# Patient Record
Sex: FEMALE | Race: WHITE | NOT HISPANIC OR LATINO | Employment: FULL TIME | URBAN - METROPOLITAN AREA
[De-identification: names, ages, dates, MRNs, and addresses within clinical notes are randomized per-mention and may not be internally consistent; named-entity substitution may affect disease eponyms.]

---

## 2017-01-02 DIAGNOSIS — F32.1 MAJOR DEPRESSIVE DISORDER, SINGLE EPISODE, MODERATE (H): ICD-10-CM

## 2017-01-02 DIAGNOSIS — F41.9 ANXIETY: Primary | ICD-10-CM

## 2017-01-02 NOTE — TELEPHONE ENCOUNTER
lexapro     Last Written Prescription Date: 10/03/16  Last Fill Quantity: 90, # refills: 0  Last Office Visit with [facility] primary care provider:  10/24/16        Last PHQ-9 score on record=   PHQ-9 SCORE 7/14/2016   Total Score -   Total Score MyChart -   Total Score 0     Sent phq9 via mychart  Routing refill request to provider for review/approval because:  Due for OV for RN protocol.  Anaya Vallejo RN  [facility]  [phone]

## 2017-01-03 ENCOUNTER — MYC MEDICAL ADVICE (OUTPATIENT)
Dept: FAMILY MEDICINE | Facility: CLINIC | Age: 52
End: 2017-01-03

## 2017-01-03 DIAGNOSIS — E03.9 ACQUIRED HYPOTHYROIDISM: Primary | ICD-10-CM

## 2017-01-03 RX ORDER — ESCITALOPRAM OXALATE 20 MG/1
TABLET ORAL
Qty: 90 TABLET | Refills: 0 | Status: SHIPPED | OUTPATIENT
Start: 2017-01-03 | End: 2017-04-02

## 2017-01-03 NOTE — TELEPHONE ENCOUNTER
Please see My Chart message below and advise as current medication listed is to take 1 tablet daily.  Please clarify and correct dose.    Entered by Prerna Cunningham APRN CNP at 10/26/2016 12:41 PM      Read by Suni Mckeon at 1/3/2017 10:03 AM     TSH (thyroid test) was higher than 9 months ago. You would benefit from an increase in levothyroxine. Please take TWO tablets daily for 8 weeks and come for a thyroid lab test     Jena Flores RN  [facility] [employer]

## 2017-01-04 PROBLEM — E03.9 ACQUIRED HYPOTHYROIDISM: Status: ACTIVE | Noted: 2017-01-04

## 2017-01-04 RX ORDER — LEVOTHYROXINE SODIUM 50 UG/1
50 TABLET ORAL DAILY
Qty: 30 TABLET | Refills: 1 | Status: SHIPPED | OUTPATIENT
Start: 2017-01-04 | End: 2017-03-02

## 2017-01-04 NOTE — TELEPHONE ENCOUNTER
Non-detailed message left to return our call or check My chart for information.    Jena Flores RN   [facility] [employer] [facility]

## 2017-01-04 NOTE — TELEPHONE ENCOUNTER
I did a refill to [facility] [place] but it is for one 50 mcg tablet of levothyroxine for her to take (instead of having to take 2 tablets).

## 2017-01-06 NOTE — TELEPHONE ENCOUNTER
PHQ-9 SCORE 1/14/2016 7/14/2016 1/3/2017   Total Score - - -   Total Score MyChart - - 0   Total Score 0 0 -       Brynn Gonzales RN   [facility] - Triage

## 2017-01-08 DIAGNOSIS — F32.1 MAJOR DEPRESSIVE DISORDER, SINGLE EPISODE, MODERATE (H): Primary | ICD-10-CM

## 2017-01-09 NOTE — TELEPHONE ENCOUNTER
Nortriptyline     Last Written Prescription Date: 10/12/16  Last Fill Quantity: 180, # refills: 0  Last Office Visit with [facility], [facility] or  [facility] prescribing provider: 10/24/16        BP Readings from Last 3 Encounters:   10/24/16 116/72   07/14/16 111/78   01/14/16 110/74     Last PHQ-9 score on record=   PHQ-9 SCORE 1/3/2017   Total Score -   Total Score MyChart 0   Total Score -         Dahiana Aguirre CMA

## 2017-01-10 RX ORDER — NORTRIPTYLINE HYDROCHLORIDE 50 MG/1
CAPSULE ORAL
Qty: 180 CAPSULE | Refills: 0 | OUTPATIENT
Start: 2017-01-10

## 2017-01-12 RX ORDER — NORTRIPTYLINE HYDROCHLORIDE 50 MG/1
CAPSULE ORAL
Qty: 180 CAPSULE | Refills: 0 | Status: SHIPPED | OUTPATIENT
Start: 2017-01-12 | End: 2017-04-12

## 2017-01-12 NOTE — TELEPHONE ENCOUNTER
nortriptyline (PAMELOR) 50 MG capsule for depression  PHQ9 0  On 1/3/17  Prescription approved per [facility] Refill Protocol.  Radha Wilks RN- [facility] [employer]

## 2017-01-12 NOTE — TELEPHONE ENCOUNTER
Pharmacy called to see why this med was denied. Please refill, not a duplicate request. Thank you.  Angela Hale,

## 2017-01-27 ENCOUNTER — HOSPITAL ENCOUNTER (OUTPATIENT)
Facility: CLINIC | Age: 52
Discharge: HOME | End: 2017-01-27
Attending: COLON & RECTAL SURGERY | Admitting: COLON & RECTAL SURGERY
Payer: COMMERCIAL

## 2017-01-27 ENCOUNTER — SURGERY (OUTPATIENT)
Age: 52
End: 2017-01-27

## 2017-01-27 VITALS
OXYGEN SATURATION: 100 % | DIASTOLIC BLOOD PRESSURE: 85 MMHG | SYSTOLIC BLOOD PRESSURE: 153 MMHG | WEIGHT: 175 LBS | BODY MASS INDEX: 29.16 KG/M2 | RESPIRATION RATE: 16 BRPM | HEIGHT: 65 IN

## 2017-01-27 LAB — COLONOSCOPY: NORMAL

## 2017-01-27 PROCEDURE — 25000125 ZZHC RX 250: Performed by: COLON & RECTAL SURGERY

## 2017-01-27 PROCEDURE — 45378 DIAGNOSTIC COLONOSCOPY: CPT | Performed by: COLON & RECTAL SURGERY

## 2017-01-27 PROCEDURE — G0121 COLON CA SCRN NOT HI RSK IND: HCPCS | Performed by: COLON & RECTAL SURGERY

## 2017-01-27 PROCEDURE — 99153 MOD SED SAME PHYS/QHP EA: CPT | Performed by: COLON & RECTAL SURGERY

## 2017-01-27 PROCEDURE — 25000128 H RX IP 250 OP 636: Performed by: COLON & RECTAL SURGERY

## 2017-01-27 PROCEDURE — G0500 MOD SEDAT ENDO SERVICE >5YRS: HCPCS | Performed by: COLON & RECTAL SURGERY

## 2017-01-27 RX ORDER — LIDOCAINE 40 MG/G
CREAM TOPICAL
Status: DISCONTINUED | OUTPATIENT
Start: 2017-01-27 | End: 2017-01-27 | Stop reason: HOSPADM

## 2017-01-27 RX ORDER — ONDANSETRON 4 MG/1
4 TABLET, ORALLY DISINTEGRATING ORAL EVERY 6 HOURS PRN
Status: DISCONTINUED | OUTPATIENT
Start: 2017-01-27 | End: 2017-01-27 | Stop reason: HOSPADM

## 2017-01-27 RX ORDER — NALOXONE HYDROCHLORIDE 0.4 MG/ML
.1-.4 INJECTION, SOLUTION INTRAMUSCULAR; INTRAVENOUS; SUBCUTANEOUS
Status: DISCONTINUED | OUTPATIENT
Start: 2017-01-27 | End: 2017-01-27 | Stop reason: HOSPADM

## 2017-01-27 RX ORDER — ONDANSETRON 2 MG/ML
INJECTION INTRAMUSCULAR; INTRAVENOUS PRN
Status: DISCONTINUED | OUTPATIENT
Start: 2017-01-27 | End: 2017-01-27 | Stop reason: HOSPADM

## 2017-01-27 RX ORDER — ONDANSETRON 2 MG/ML
4 INJECTION INTRAMUSCULAR; INTRAVENOUS EVERY 6 HOURS PRN
Status: DISCONTINUED | OUTPATIENT
Start: 2017-01-27 | End: 2017-01-27 | Stop reason: HOSPADM

## 2017-01-27 RX ORDER — ONDANSETRON 2 MG/ML
4 INJECTION INTRAMUSCULAR; INTRAVENOUS
Status: DISCONTINUED | OUTPATIENT
Start: 2017-01-27 | End: 2017-01-27 | Stop reason: HOSPADM

## 2017-01-27 RX ORDER — FENTANYL CITRATE 50 UG/ML
INJECTION, SOLUTION INTRAMUSCULAR; INTRAVENOUS PRN
Status: DISCONTINUED | OUTPATIENT
Start: 2017-01-27 | End: 2017-01-27 | Stop reason: HOSPADM

## 2017-01-27 RX ORDER — FLUMAZENIL 0.1 MG/ML
0.2 INJECTION, SOLUTION INTRAVENOUS
Status: DISCONTINUED | OUTPATIENT
Start: 2017-01-27 | End: 2017-01-27 | Stop reason: HOSPADM

## 2017-01-27 RX ORDER — SODIUM CHLORIDE 9 MG/ML
INJECTION, SOLUTION INTRAVENOUS CONTINUOUS PRN
Status: DISCONTINUED | OUTPATIENT
Start: 2017-01-27 | End: 2017-01-27 | Stop reason: HOSPADM

## 2017-01-27 RX ADMIN — SODIUM CHLORIDE 1000 ML/HR: 9 INJECTION, SOLUTION INTRAVENOUS at 12:14

## 2017-01-27 RX ADMIN — SODIUM CHLORIDE 1000 ML: 9 INJECTION, SOLUTION INTRAVENOUS at 13:56

## 2017-01-27 RX ADMIN — MIDAZOLAM HYDROCHLORIDE 1 MG: 1 INJECTION, SOLUTION INTRAMUSCULAR; INTRAVENOUS at 13:19

## 2017-01-27 RX ADMIN — ONDANSETRON 4 MG: 2 INJECTION INTRAMUSCULAR; INTRAVENOUS at 13:55

## 2017-01-27 RX ADMIN — MIDAZOLAM HYDROCHLORIDE 2 MG: 1 INJECTION, SOLUTION INTRAMUSCULAR; INTRAVENOUS at 13:04

## 2017-01-27 RX ADMIN — FENTANYL CITRATE 100 MCG: 50 INJECTION, SOLUTION INTRAMUSCULAR; INTRAVENOUS at 13:04

## 2017-01-27 NOTE — H&P
Pre-Endoscopy History and Physical     Suni Mckeon MRN# [id number]   YOB: 1965 Age: 51 year old     Date of Procedure: 1/27/2017  Primary care provider: Prerna Cunningham  Type of Endoscopy: Colonoscopy  Reason for Procedure: Screening  Type of Anesthesia Anticipated: Moderate Sedation    HPI:    Suni is a 51 year old female who will be undergoing the above procedure.      A history and physical has been performed. The patient's medications and allergies have been reviewed. The risks and benefits of the procedure and the sedation options and risks were discussed with the patient.  All questions were answered and informed consent was obtained.      She denies a personal or family history of anesthesia complications or bleeding disorders.     Allergies   Allergen Reactions     Bupropion Itching     Tobramycin Visual Disturbance     used for pink eye and eyes became more irritated, burning and itchy          No current facility-administered medications on file prior to encounter.  Current Outpatient Prescriptions on File Prior to Encounter:  escitalopram (LEXAPRO) 20 MG tablet TAKE ONE TABLET BY MOUTH ONE TIME DAILY   levothyroxine (SYNTHROID, LEVOTHROID) 25 MCG tablet Take 1 tablet (25 mcg) by mouth daily   levothyroxine (SYNTHROID/LEVOTHROID) 50 MCG tablet Take 1 tablet (50 mcg) by mouth daily       Patient Active Problem List   Diagnosis     CARDIOVASCULAR SCREENING; LDL GOAL LESS THAN 160     Anxiety     Major depressive disorder, single episode, moderate (H)     Acquired hypothyroidism        Past Medical History   Diagnosis Date     Irritable bowel syndrome 1994     constipation     Pyelonephritis, unspecified 2006     Recurrent cold sores      Lysine     Moderate major depression (H)      Anxiety      Thyroid disease         Past Surgical History   Procedure Laterality Date     Surgical history of -   2005     Exc. benign lesion left lower eyelid       Social History   Substance Use Topics      "Smoking status: Never Smoker      Smokeless tobacco: Never Used     Alcohol Use: 0.0 - 1.2 oz/week     0-2 Standard drinks or equivalent per week      Comment: 2-3 drinks per mth       Family History   Problem Relation Age of Onset     DIABETES Paternal Grandmother      Psychotic Disorder Maternal Grandmother      depression     Lipids Father        REVIEW OF SYSTEMS:     5 point ROS negative except as noted above in HPI, including Gen., Resp., CV, GI &  system review.      PHYSICAL EXAM:   /53 mmHg  Resp 16  Ht 1.651 m (5' 5\")  Wt 79.379 kg (175 lb)  BMI 29.12 kg/m2  SpO2 98%  LMP 12/22/2016 Estimated body mass index is 29.12 kg/(m^2) as calculated from the following:    Height as of this encounter: 1.651 m (5' 5\").    Weight as of this encounter: 79.379 kg (175 lb).   GENERAL APPEARANCE: healthy and alert  MENTAL STATUS: alert  AIRWAY EXAM: Mallampatti Class II (visualization of the soft palate, fauces, and uvula)  RESP: lungs clear to auscultation - no rales, rhonchi or wheezes  CV: regular rates and rhythm, normal S1 S2, no S3 or S4 and no murmur, click or rub      IMPRESSION   ASA Class 1 - Healthy patient, no medical problems        PLAN:     Plan for colonoscopy. We discussed the risks, benefits and alternatives and the patient wished to proceed.    The above has been forwarded to the consulting provider.      Duc Grant MD  [facility]  Phone: [phone]  January 27, 2017    "

## 2017-01-27 NOTE — OR NURSING
Dr Grant in and talkis with patient, she is to have full liquids until Sunday and reprep for colonoscopy on Monday with DR Osuna. Pt prep poor.

## 2017-01-30 ENCOUNTER — SURGERY (OUTPATIENT)
Age: 52
End: 2017-01-30

## 2017-01-30 ENCOUNTER — HOSPITAL ENCOUNTER (OUTPATIENT)
Facility: CLINIC | Age: 52
Discharge: HOME | End: 2017-01-30
Attending: COLON & RECTAL SURGERY | Admitting: COLON & RECTAL SURGERY
Payer: COMMERCIAL

## 2017-01-30 VITALS
SYSTOLIC BLOOD PRESSURE: 112 MMHG | RESPIRATION RATE: 18 BRPM | OXYGEN SATURATION: 100 % | DIASTOLIC BLOOD PRESSURE: 77 MMHG

## 2017-01-30 LAB — COLONOSCOPY: NORMAL

## 2017-01-30 PROCEDURE — 25000125 ZZHC RX 250: Performed by: COLON & RECTAL SURGERY

## 2017-01-30 PROCEDURE — 45378 DIAGNOSTIC COLONOSCOPY: CPT | Performed by: COLON & RECTAL SURGERY

## 2017-01-30 PROCEDURE — G0500 MOD SEDAT ENDO SERVICE >5YRS: HCPCS | Performed by: COLON & RECTAL SURGERY

## 2017-01-30 PROCEDURE — 99153 MOD SED SAME PHYS/QHP EA: CPT | Performed by: COLON & RECTAL SURGERY

## 2017-01-30 PROCEDURE — G0121 COLON CA SCRN NOT HI RSK IND: HCPCS | Performed by: COLON & RECTAL SURGERY

## 2017-01-30 RX ORDER — ONDANSETRON 2 MG/ML
4 INJECTION INTRAMUSCULAR; INTRAVENOUS EVERY 6 HOURS PRN
Status: CANCELLED | OUTPATIENT
Start: 2017-01-30

## 2017-01-30 RX ORDER — FENTANYL CITRATE 50 UG/ML
INJECTION, SOLUTION INTRAMUSCULAR; INTRAVENOUS PRN
Status: DISCONTINUED | OUTPATIENT
Start: 2017-01-30 | End: 2017-01-30 | Stop reason: HOSPADM

## 2017-01-30 RX ORDER — FLUMAZENIL 0.1 MG/ML
0.2 INJECTION, SOLUTION INTRAVENOUS
Status: CANCELLED | OUTPATIENT
Start: 2017-01-30 | End: 2017-01-30

## 2017-01-30 RX ORDER — LIDOCAINE 40 MG/G
CREAM TOPICAL
Status: DISCONTINUED | OUTPATIENT
Start: 2017-01-30 | End: 2017-01-30 | Stop reason: HOSPADM

## 2017-01-30 RX ORDER — ONDANSETRON 4 MG/1
4 TABLET, ORALLY DISINTEGRATING ORAL EVERY 6 HOURS PRN
Status: CANCELLED | OUTPATIENT
Start: 2017-01-30

## 2017-01-30 RX ORDER — NALOXONE HYDROCHLORIDE 0.4 MG/ML
.1-.4 INJECTION, SOLUTION INTRAMUSCULAR; INTRAVENOUS; SUBCUTANEOUS
Status: CANCELLED | OUTPATIENT
Start: 2017-01-30 | End: 2017-01-31

## 2017-01-30 RX ORDER — ONDANSETRON 2 MG/ML
4 INJECTION INTRAMUSCULAR; INTRAVENOUS
Status: COMPLETED | OUTPATIENT
Start: 2017-01-30 | End: 2017-01-30

## 2017-01-30 RX ADMIN — ONDANSETRON HYDROCHLORIDE 4 MG: 2 INJECTION, SOLUTION INTRAMUSCULAR; INTRAVENOUS at 08:03

## 2017-01-30 RX ADMIN — FENTANYL CITRATE 100 MCG: 50 INJECTION, SOLUTION INTRAMUSCULAR; INTRAVENOUS at 08:27

## 2017-01-30 RX ADMIN — MIDAZOLAM HYDROCHLORIDE 2 MG: 1 INJECTION, SOLUTION INTRAMUSCULAR; INTRAVENOUS at 08:27

## 2017-01-30 NOTE — H&P
Pre-Endoscopy History and Physical     Suni Mckeon MRN# [id number]   YOB: 1965 Age: 51 year old     Date of Procedure: 1/30/2017  Primary care provider: Prerna Cunningham  Type of Endoscopy: Colonoscopy  Reason for Procedure: screening  Type of Anesthesia Anticipated: Moderate Sedation    HPI:    Suni is a 51 year old female who will be undergoing the above procedure.      A history and physical has been performed. The patient's medications and allergies have been reviewed. The risks and benefits of the procedure and the sedation options and risks were discussed with the patient.  All questions were answered and informed consent was obtained.      She denies a personal or family history of anesthesia complications or bleeding disorders.     Allergies   Allergen Reactions     Bupropion Itching     Tobramycin Visual Disturbance     used for pink eye and eyes became more irritated, burning and itchy          No current facility-administered medications on file prior to encounter.  Current Outpatient Prescriptions on File Prior to Encounter:  nortriptyline (PAMELOR) 50 MG capsule TAKE TWO CAPSULES BY MOUTH DAILY AT BEDTIME   levothyroxine (SYNTHROID/LEVOTHROID) 50 MCG tablet Take 1 tablet (50 mcg) by mouth daily   escitalopram (LEXAPRO) 20 MG tablet TAKE ONE TABLET BY MOUTH ONE TIME DAILY   levothyroxine (SYNTHROID, LEVOTHROID) 25 MCG tablet Take 1 tablet (25 mcg) by mouth daily       Patient Active Problem List   Diagnosis     CARDIOVASCULAR SCREENING; LDL GOAL LESS THAN 160     Anxiety     Major depressive disorder, single episode, moderate (H)     Acquired hypothyroidism        Past Medical History   Diagnosis Date     Irritable bowel syndrome 1994     constipation     Pyelonephritis, unspecified 2006     Recurrent cold sores      Lysine     Moderate major depression (H)      Anxiety      Thyroid disease         Past Surgical History   Procedure Laterality Date     Surgical history of -   2005     Exc.  "benign lesion left lower eyelid     Colonoscopy N/A 1/27/2017     Procedure: COLONOSCOPY;  Surgeon: Duc Grant MD;  Location:  GI       Social History   Substance Use Topics     Smoking status: Never Smoker      Smokeless tobacco: Never Used     Alcohol Use: 0.0 - 1.2 oz/week     0-2 Standard drinks or equivalent per week      Comment: 2-3 drinks per mth       Family History   Problem Relation Age of Onset     DIABETES Paternal Grandmother      Psychotic Disorder Maternal Grandmother      depression     Lipids Father        REVIEW OF SYSTEMS:     5 point ROS negative except as noted above in HPI, including Gen., Resp., CV, GI &  system review.      PHYSICAL EXAM:   /75 mmHg  Resp 14  SpO2 98%  LMP 12/22/2016 Estimated body mass index is 29.12 kg/(m^2) as calculated from the following:    Height as of 1/27/17: 1.651 m (5' 5\").    Weight as of 1/27/17: 79.379 kg (175 lb).   GENERAL APPEARANCE: healthy and alert  MENTAL STATUS: alert  AIRWAY EXAM: Mallampatti Class I (visualization of the soft palate, fauces, uvula, anterior and posterior pillars)  RESP: lungs clear to auscultation - no rales, rhonchi or wheezes  CV: regular rates and rhythm      IMPRESSION   ASA Class 2 - Mild systemic disease        PLAN:     Plan for colonoscopy. We discussed the risks, benefits and alternatives and the patient wished to proceed.    The above has been forwarded to the consulting provider.      Dahiana Osuna MD  [facility]  Phone: [phone]  Fax: [phone]  January 30, 2017    "

## 2017-03-03 DIAGNOSIS — E03.9 ACQUIRED HYPOTHYROIDISM: ICD-10-CM

## 2017-03-03 PROCEDURE — 36415 COLL VENOUS BLD VENIPUNCTURE: CPT | Performed by: NURSE PRACTITIONER

## 2017-03-03 PROCEDURE — 84443 ASSAY THYROID STIM HORMONE: CPT | Performed by: NURSE PRACTITIONER

## 2017-03-04 LAB — TSH SERPL DL<=0.005 MIU/L-ACNC: 2.63 MU/L (ref 0.4–4)

## 2017-04-01 DIAGNOSIS — E03.9 ACQUIRED HYPOTHYROIDISM: ICD-10-CM

## 2017-04-02 DIAGNOSIS — F41.9 ANXIETY: ICD-10-CM

## 2017-04-02 DIAGNOSIS — F32.1 MAJOR DEPRESSIVE DISORDER, SINGLE EPISODE, MODERATE (H): ICD-10-CM

## 2017-04-03 RX ORDER — LEVOTHYROXINE SODIUM 50 UG/1
TABLET ORAL
Qty: 90 TABLET | Refills: 1 | Status: SHIPPED | OUTPATIENT
Start: 2017-04-03 | End: 2017-09-24

## 2017-04-03 NOTE — TELEPHONE ENCOUNTER
levothyroxine     Last Written Prescription Date: 3/3/17  Last Quantity: 30, # refills: 0  Last Office Visit with [facility], [facility] or [facility] prescribing provider: 10/24/16        TSH   Date Value Ref Range Status   03/03/2017 2.63 0.40 - 4.00 mU/L Final     Prescription approved per [facility] Refill Protocol.  Brynn Gonzales RN   [facility] - Triage

## 2017-04-03 NOTE — TELEPHONE ENCOUNTER
Lexapro     Last Written Prescription Date:   Last Fill Quantity: 90 # refills: 0  Last Office Visit with [facility], [facility] or [facility] prescribing provider:  10/24/2016       Last PHQ-9 score on record=   PHQ-9 SCORE 1/3/2017   Total Score -   Total Score MyChart 0   Total Score -       Lab Results   Component Value Date    AST 21 07/09/2014     Lab Results   Component Value Date    ALT 24 07/09/2014

## 2017-04-04 NOTE — TELEPHONE ENCOUNTER
Routing refill request to provider for review/approval because:  Labs not current:  Ast/alt    Anaya VallejoRN  [facility]  [phone]

## 2017-04-05 RX ORDER — ESCITALOPRAM OXALATE 20 MG/1
TABLET ORAL
Qty: 90 TABLET | Refills: 0 | Status: SHIPPED | OUTPATIENT
Start: 2017-04-05 | End: 2017-07-01

## 2017-04-12 DIAGNOSIS — F32.1 MAJOR DEPRESSIVE DISORDER, SINGLE EPISODE, MODERATE (H): ICD-10-CM

## 2017-04-13 NOTE — TELEPHONE ENCOUNTER
Nortriptyline  Last Written Prescription Date: 1/12/17  Last Fill Quantity: 180, # refills: 0  Last Office Visit with [facility], [facility] or [facility] prescribing provider: 10/24/16        BP Readings from Last 3 Encounters:   01/30/17 112/77   01/27/17 153/85   10/24/16 116/72     Last PHQ-9 score on record=   PHQ-9 SCORE 1/3/2017   Total Score -   Total Score MyChart 0   Total Score -         ARELIS Garcia LPN

## 2017-04-14 RX ORDER — NORTRIPTYLINE HYDROCHLORIDE 50 MG/1
CAPSULE ORAL
Qty: 180 CAPSULE | Refills: 0 | Status: SHIPPED | OUTPATIENT
Start: 2017-04-14 | End: 2017-07-15

## 2017-04-14 NOTE — TELEPHONE ENCOUNTER
appt and PHQ-9 up to date and at goal  Refill request approved per [facility] protocol    Sue Euceda RN

## 2017-07-01 DIAGNOSIS — F32.1 MAJOR DEPRESSIVE DISORDER, SINGLE EPISODE, MODERATE (H): ICD-10-CM

## 2017-07-01 DIAGNOSIS — F41.9 ANXIETY: ICD-10-CM

## 2017-07-03 NOTE — TELEPHONE ENCOUNTER
Mini cath preformed, urine sent to lab   Sent phq9 via [employer] .  Anaya Vallejo,RN  [facility]  [phone]

## 2017-07-03 NOTE — TELEPHONE ENCOUNTER
escitalopram (LEXAPRO) 20 MG tablet     Last Written Prescription Date: 4/5/2017  Last Fill Quantity: 90, # refills: 0  Last Office Visit with [facility] primary care provider:  10/24/2016        Last PHQ-9 score on record=   PHQ-9 SCORE 1/3/2017   Total Score -   Total Score MyChart 0   Total Score -

## 2017-07-06 RX ORDER — ESCITALOPRAM OXALATE 20 MG/1
TABLET ORAL
Qty: 90 TABLET | Refills: 1 | Status: SHIPPED | OUTPATIENT
Start: 2017-07-06 | End: 2017-12-18

## 2017-07-06 NOTE — TELEPHONE ENCOUNTER
Called home and cell numbers- left message to call back asap or check [employer] message.  Routing to provider    Anaya Vallejo RN  [facility]  [phone]

## 2017-07-15 DIAGNOSIS — F32.1 MAJOR DEPRESSIVE DISORDER, SINGLE EPISODE, MODERATE (H): ICD-10-CM

## 2017-07-17 RX ORDER — NORTRIPTYLINE HYDROCHLORIDE 50 MG/1
CAPSULE ORAL
Qty: 180 CAPSULE | Refills: 0 | Status: SHIPPED | OUTPATIENT
Start: 2017-07-17 | End: 2017-10-10

## 2017-07-17 NOTE — TELEPHONE ENCOUNTER
Routing refill request to provider for review/approval because:  Patient needs to be seen because:  Prerna Cunningham Patient- provider retired    Anaya VallejoRN  [facility]  [phone]

## 2017-07-17 NOTE — TELEPHONE ENCOUNTER
nortriptyline (PAMELOR) 50 MG capsule     Last Written Prescription Date: 04/14/2017  Last Fill Quantity: 180 capsule, # refills: 0  Last Office Visit with [facility], [facility] or [facility] prescribing provider: 10/24/2016        BP Readings from Last 3 Encounters:   01/30/17 112/77   01/27/17 153/85   10/24/16 116/72     Last PHQ-9 score on record=   PHQ-9 SCORE 7/6/2017   Total Score MyChart 0   Total Score 0           Jada Alexander MA

## 2017-09-09 ENCOUNTER — HEALTH MAINTENANCE LETTER (OUTPATIENT)
Age: 52
End: 2017-09-09

## 2017-09-24 DIAGNOSIS — E03.9 ACQUIRED HYPOTHYROIDISM: ICD-10-CM

## 2017-09-25 RX ORDER — LEVOTHYROXINE SODIUM 50 UG/1
TABLET ORAL
Qty: 30 TABLET | Refills: 0 | Status: SHIPPED | OUTPATIENT
Start: 2017-09-25 | End: 2017-10-10

## 2017-09-25 NOTE — TELEPHONE ENCOUNTER
levothyroxine     Last Written Prescription Date: 4/3/17  Last Quantity: 90, # refills: 1  Last Office Visit with [facility], [facility] or [facility] prescribing provider: 10/24/16        TSH   Date Value Ref Range Status   03/03/2017 2.63 0.40 - 4.00 mU/L Final     Patient needs to establish care with new provider. 30 day supply ordered. Routing to team to inform and assist in scheduling.     Brynn Gonzales RN   [facility] - Triage

## 2017-09-27 NOTE — TELEPHONE ENCOUNTER
Patient has not looked at her mychart   left voicemail message for patient to contact Main Clinic Number to schedule.  NTBS: establish care with new provider and medication check  Mary Lou SPARROW

## 2017-10-10 ENCOUNTER — OFFICE VISIT (OUTPATIENT)
Dept: FAMILY MEDICINE | Facility: CLINIC | Age: 52
End: 2017-10-10
Payer: COMMERCIAL

## 2017-10-10 VITALS
WEIGHT: 185.8 LBS | TEMPERATURE: 97.5 F | RESPIRATION RATE: 16 BRPM | BODY MASS INDEX: 30.96 KG/M2 | OXYGEN SATURATION: 96 % | HEART RATE: 82 BPM | HEIGHT: 65 IN | DIASTOLIC BLOOD PRESSURE: 80 MMHG | SYSTOLIC BLOOD PRESSURE: 124 MMHG

## 2017-10-10 DIAGNOSIS — F32.1 MAJOR DEPRESSIVE DISORDER, SINGLE EPISODE, MODERATE (H): Primary | ICD-10-CM

## 2017-10-10 DIAGNOSIS — F41.9 ANXIETY: ICD-10-CM

## 2017-10-10 DIAGNOSIS — E03.9 ACQUIRED HYPOTHYROIDISM: ICD-10-CM

## 2017-10-10 PROCEDURE — 99213 OFFICE O/P EST LOW 20 MIN: CPT | Performed by: PHYSICIAN ASSISTANT

## 2017-10-10 RX ORDER — NORTRIPTYLINE HYDROCHLORIDE 50 MG/1
CAPSULE ORAL
Qty: 180 CAPSULE | Refills: 1 | Status: SHIPPED | OUTPATIENT
Start: 2017-10-10 | End: 2018-04-06

## 2017-10-10 RX ORDER — LEVOTHYROXINE SODIUM 50 UG/1
TABLET ORAL
Qty: 30 TABLET | Refills: 1 | Status: SHIPPED | OUTPATIENT
Start: 2017-10-10 | End: 2017-12-16

## 2017-10-10 ASSESSMENT — ANXIETY QUESTIONNAIRES
1. FEELING NERVOUS, ANXIOUS, OR ON EDGE: NOT AT ALL
IF YOU CHECKED OFF ANY PROBLEMS ON THIS QUESTIONNAIRE, HOW DIFFICULT HAVE THESE PROBLEMS MADE IT FOR YOU TO DO YOUR WORK, TAKE CARE OF THINGS AT HOME, OR GET ALONG WITH OTHER PEOPLE: NOT DIFFICULT AT ALL
3. WORRYING TOO MUCH ABOUT DIFFERENT THINGS: NOT AT ALL
7. FEELING AFRAID AS IF SOMETHING AWFUL MIGHT HAPPEN: NOT AT ALL
5. BEING SO RESTLESS THAT IT IS HARD TO SIT STILL: NOT AT ALL
GAD7 TOTAL SCORE: 0
6. BECOMING EASILY ANNOYED OR IRRITABLE: NOT AT ALL
2. NOT BEING ABLE TO STOP OR CONTROL WORRYING: NOT AT ALL

## 2017-10-10 ASSESSMENT — PATIENT HEALTH QUESTIONNAIRE - PHQ9
5. POOR APPETITE OR OVEREATING: NOT AT ALL
SUM OF ALL RESPONSES TO PHQ QUESTIONS 1-9: 0

## 2017-10-10 NOTE — MR AVS SNAPSHOT
"              After Visit Summary   10/10/2017    Suni Mckeon    MRN: [id number]           Patient Information     Date Of Birth          1965        Visit Information        Provider Department      10/10/2017 3:40 PM Edis Yoon PA-C [facility] [place]        Today's Diagnoses     Major depressive disorder, single episode, moderate (H)    -  1    Anxiety        Acquired hypothyroidism           Follow-ups after your visit        Who to contact     If you have questions or need follow up information about today's clinic visit or your schedule please contact [facility] directly at [phone].  Normal or non-critical lab and imaging results will be communicated to you by invendo medicalhart, letter or phone within 4 business days after the clinic has received the results. If you do not hear from us within 7 days, please contact the clinic through invendo medicalhart or phone. If you have a critical or abnormal lab result, we will notify you by phone as soon as possible.  Submit refill requests through [facility] or call your pharmacy and they will forward the refill request to us. Please allow 3 business days for your refill to be completed.          Additional Information About Your Visit        MyChart Information     [facility] gives you secure access to your electronic health record. If you see a primary care provider, you can also send messages to your care team and make appointments. If you have questions, please call your primary care clinic.  If you do not have a primary care provider, please call [phone] and they will assist you.        Care EveryWhere ID     This is your Care EveryWhere ID. This could be used by other organizations to access your [facility] medical records  [id number]        Your Vitals Were     Pulse Temperature Respirations Height Last Period Pulse Oximetry    82 97.5  F (36.4  C) (Tympanic) 16 5' 5\" (1.651 m) 12/13/2016 (LMP Unknown) 96%    BMI (Body Mass " Index)                   30.92 kg/m2            Blood Pressure from Last 3 Encounters:   10/10/17 124/80   01/30/17 112/77   01/27/17 153/85    Weight from Last 3 Encounters:   10/10/17 185 lb 12.8 oz (84.3 kg)   01/27/17 175 lb (79.4 kg)   10/24/16 176 lb (79.8 kg)              We Performed the Following     DEPRESSION ACTION PLAN (DAP)          Today's Medication Changes          These changes are accurate as of: 10/10/17 11:59 PM.  If you have any questions, ask your nurse or doctor.               These medicines have changed or have updated prescriptions.        Dose/Directions    levothyroxine 50 MCG tablet   Commonly known as:  SYNTHROID/LEVOTHROID   This may have changed:  See the new instructions.   Used for:  Acquired hypothyroidism   Changed by:  Edis Yoon PA-C        TAKE 1 TABLET (50 MCG) BY MOUTH DAILY   Quantity:  30 tablet   Refills:  1       nortriptyline 50 MG capsule   Commonly known as:  PAMELOR   This may have changed:  See the new instructions.   Used for:  Major depressive disorder, single episode, moderate (H)   Changed by:  Edis Yoon PA-C        TAKE TWO CAPSULES BY MOUTH DAILY AT BEDTIME   Quantity:  180 capsule   Refills:  1            Where to get your medicines      These medications were sent to [place] [address] [place] [facility] [place] - [address]  [address] [place] [address]     Phone:  [phone]     levothyroxine 50 MCG tablet    nortriptyline 50 MG capsule                Primary Care Provider Office Phone # Fax #    Edis Yoon PA-C [phone]       [address]  [place] [address]        Equal Access to Services     [facility] AH: Hadii beau ku hadasho Sogustabo, waaxda luqadaha, qaybta kaalmada adeegyada, karla san. So [facility] [phone].    ATENCIÓN: Si habla español, tiene a may disposición servicios gratuitos de asistencia lingüística. Llame al  [phone].    We comply with applicable federal civil rights laws and [place] laws. We do not discriminate on the basis of race, color, national origin, age, disability, sex, sexual orientation, or gender identity.            Thank you!     Thank you for choosing [facility] [place]  for your care. Our goal is always to provide you with excellent care. Hearing back from our patients is one way we can continue to improve our services. Please take a few minutes to complete the written survey that you may receive in the mail after your visit with us. Thank you!             Your Updated Medication List - Protect others around you: Learn how to safely use, store and throw away your medicines at www.disposemymeds.org.          This list is accurate as of: 10/10/17 11:59 PM.  Always use your most recent med list.                   Brand Name Dispense Instructions for use Diagnosis    escitalopram 20 MG tablet    LEXAPRO    90 tablet    TAKE ONE TABLET BY MOUTH ONE TIME DAILY    Anxiety, Major depressive disorder, single episode, moderate (H)       levothyroxine 50 MCG tablet    SYNTHROID/LEVOTHROID    30 tablet    TAKE 1 TABLET (50 MCG) BY MOUTH DAILY    Acquired hypothyroidism       nortriptyline 50 MG capsule    PAMELOR    180 capsule    TAKE TWO CAPSULES BY MOUTH DAILY AT BEDTIME    Major depressive disorder, single episode, moderate (H)

## 2017-10-10 NOTE — LETTER
My Depression Action Plan  Name: Suni Mckeon   Date of Birth 1965  Date: 10/10/2017    My doctor: Edis Yoon   My clinic: [address] [address]  [phone]          GREEN    ZONE   Good Control    What it looks like:     Things are going generally well. You have normal up s and down s. You may even feel depressed from time to time, but bad moods usually last less than a day.   What you need to do:  1. Continue to care for yourself (see self care plan)  2. Check your depression survival kit and update it as needed  3. Follow your physician s recommendations including any medication.  4. Do not stop taking medication unless you consult with your physician first.           YELLOW         ZONE Getting Worse    What it looks like:     Depression is starting to interfere with your life.     It may be hard to get out of bed; you may be starting to isolate yourself from others.    Symptoms of depression are starting to last most all day and this has happened for several days.     You may have suicidal thoughts but they are not constant.   What you need to do:     1. Call your care team, your response to treatment will improve if you keep your care team informed of your progress. Yellow periods are signs an adjustment may need to be made.     2. Continue your self-care, even if you have to fake it!    3. Talk to someone in your support network    4. Open up your depression survival kit           RED    ZONE Medical Alert - Get Help    What it looks like:     Depression is seriously interfering with your life.     You may experience these or other symptoms: You can t get out of bed most days, can t work or engage in other necessary activities, you have trouble taking care of basic hygiene, or basic responsibilities, thoughts of suicide or death that will not go away, self-injurious behavior.     What you need to do:  1. Call  your care team and request a same-day appointment. If they are not available (weekends or after hours) call your local crisis line, emergency room or 911.      Electronically signed by: Edis Yoon, October 10, 2017    Depression Self Care Plan / Survival Kit    Self-Care for Depression  Here s the deal. Your body and mind are really not as separate as most people think.  What you do and think affects how you feel and how you feel influences what you do and think. This means if you do things that people who feel good do, it will help you feel better.  Sometimes this is all it takes.  There is also a place for medication and therapy depending on how severe your depression is, so be sure to consult with your medical provider and/ or Behavioral Health Consultant if your symptoms are worsening or not improving.     In order to better manage my stress, I will:    Exercise  Get some form of exercise, every day. This will help reduce pain and release endorphins, the  feel good  chemicals in your brain. This is almost as good as taking antidepressants!  This is not the same as joining a gym and then never going! (they count on that by the way ) It can be as simple as just going for a walk or doing some gardening, anything that will get you moving.      Hygiene   Maintain good hygiene (Get out of bed in the morning, Make your bed, Brush your teeth, Take a shower, and Get dressed like you were going to work, even if you are unemployed).  If your clothes don't fit try to get ones that do.    Diet  I will strive to eat foods that are good for me, drink plenty of water, and avoid excessive sugar, caffeine, alcohol, and other mood-altering substances.  Some foods that are helpful in depression are: complex carbohydrates, B vitamins, flaxseed, fish or fish oil, fresh fruits and vegetables.    Psychotherapy  I agree to participate in Individual Therapy (if recommended).    Medication  If prescribed medications, I agree to  take them.  Missing doses can result in serious side effects.  I understand that drinking alcohol, or other illicit drug use, may cause potential side effects.  I will not stop my medication abruptly without first discussing it with my provider.    Staying Connected With Others  I will stay in touch with my friends, family members, and my primary care provider/team.    Use your imagination  Be creative.  We all have a creative side; it doesn t matter if it s oil painting, sand castles, or mud pies! This will also kick up the endorphins.    Witness Beauty  (AKA stop and smell the roses) Take a look outside, even in mid-winter. Notice colors, textures. Watch the squirrels and birds.     Service to others  Be of service to others.  There is always someone else in need.  By helping others we can  get out of ourselves  and remember the really important things.  This also provides opportunities for practicing all the other parts of the program.    Humor  Laugh and be silly!  Adjust your TV habits for less news and crime-drama and more comedy.    Control your stress  Try breathing deep, massage therapy, biofeedback, and meditation. Find time to relax each day.     My support system    Clinic Contact:  Phone number:    Contact 1:  Phone number:    Contact 2:  Phone number:    [facility]/:  Phone number:    Therapist:  Phone number:    Local crisis center:    Phone number:    Other community support:  Phone number:

## 2017-10-10 NOTE — PROGRESS NOTES
SUBJECTIVE:   Suni Mckeon is a 52 year old female who presents to clinic today for the following health issues:    Establish Care      Depression:  Taking Nortriptyline and  Lexapro, this combination is working well for her depression.  Has a lot of stressors with her family but feels that right now her mood is well controlled.  Sees therapist.  Due for refills today.  See PHQ.    Hypothyroid:  Due for CPE and Thryoid check but too early per insurance today, needs refills, no symptoms.      Problem list and histories reviewed & adjusted, as indicated.  Additional history: as documented    Patient Active Problem List   Diagnosis     CARDIOVASCULAR SCREENING; LDL GOAL LESS THAN 160     Anxiety     Major depressive disorder, single episode, moderate (H)     Acquired hypothyroidism     Past Surgical History:   Procedure Laterality Date     COLONOSCOPY N/A 1/27/2017    NL, repeat 10 years     COLONOSCOPY N/A 1/30/2017    Procedure: COLONOSCOPY;  Surgeon: Dahiana Osuna MD;  Location:  GI     SURGICAL HISTORY OF -   2005    Exc. benign lesion left lower eyelid       Social History   Substance Use Topics     Smoking status: Never Smoker     Smokeless tobacco: Never Used     Alcohol use 0.0 - 1.2 oz/week     0 - 2 Standard drinks or equivalent per week      Comment: 2-3 drinks per mth     Family History   Problem Relation Age of Onset     DIABETES Paternal Grandmother      Psychotic Disorder Maternal Grandmother      depression     Lipids Father          Current Outpatient Prescriptions   Medication Sig Dispense Refill     levothyroxine (SYNTHROID/LEVOTHROID) 50 MCG tablet TAKE 1 TABLET (50 MCG) BY MOUTH DAILY 30 tablet 1     nortriptyline (PAMELOR) 50 MG capsule TAKE TWO CAPSULES BY MOUTH DAILY AT BEDTIME 180 capsule 1     escitalopram (LEXAPRO) 20 MG tablet TAKE ONE TABLET BY MOUTH ONE TIME DAILY 90 tablet 1     Allergies   Allergen Reactions     Bupropion Itching     Tobramycin Visual Disturbance     used  "for pink eye and eyes became more irritated, burning and itchy         Reviewed and updated as needed this visit by clinical staff     Reviewed and updated as needed this visit by Provider         ROS:  Constitutional, HEENT, cardiovascular, pulmonary, gi and gu systems are negative, except as otherwise noted.      OBJECTIVE:   /80 (BP Location: Right arm, Patient Position: Chair, Cuff Size: Adult Large)  Pulse 82  Temp 97.5  F (36.4  C) (Tympanic)  Resp 16  Ht 5' 5\" (1.651 m)  Wt 185 lb 12.8 oz (84.3 kg)  LMP 12/13/2016 (LMP Unknown)  SpO2 96%  BMI 30.92 kg/m2  Body mass index is 30.92 kg/(m^2).  GENERAL: healthy, alert and no distress  RESP: lungs clear to auscultation - no rales, rhonchi or wheezes  CV: regular rate and rhythm, normal S1 S2, no S3 or S4, no murmur  PSYCH: mentation appears normal, affect normal/bright    Diagnostic Test Results:  none         1. Major depressive disorder, single episode, moderate (H)  Controlled  - nortriptyline (PAMELOR) 50 MG capsule; TAKE TWO CAPSULES BY MOUTH DAILY AT BEDTIME  Dispense: 180 capsule; Refill: 1    2. Anxiety  controlled    3. Acquired hypothyroidism  Return for labs/CPE and pap in 2 weeks, med refilled x 2 months until she can make appointment for CPE  - levothyroxine (SYNTHROID/LEVOTHROID) 50 MCG tablet; TAKE 1 TABLET (50 MCG) BY MOUTH DAILY  Dispense: 30 tablet; Refill: 1    Follow-Up: As above    Edis Yoon PA-C  [facility]  "

## 2017-10-11 ASSESSMENT — ANXIETY QUESTIONNAIRES: GAD7 TOTAL SCORE: 0

## 2017-12-16 DIAGNOSIS — E03.9 ACQUIRED HYPOTHYROIDISM: ICD-10-CM

## 2017-12-18 DIAGNOSIS — F32.1 MAJOR DEPRESSIVE DISORDER, SINGLE EPISODE, MODERATE (H): ICD-10-CM

## 2017-12-18 DIAGNOSIS — F41.9 ANXIETY: ICD-10-CM

## 2017-12-18 NOTE — TELEPHONE ENCOUNTER
Requested Prescriptions   Pending Prescriptions Disp Refills     levothyroxine (SYNTHROID/LEVOTHROID) 50 MCG tablet [Pharmacy Med Name: LEVOTHYROXINE 50 MCG TABLET]  Last Written Prescription Date:  10/10/2017  Last Fill Quantity: 30,  # refills: 1   Last Office Visit with [facility], [facility] or [facility] prescribing provider:  10/10/2017  Future Office Visit:    30 tablet 1     Sig: TAKE 1 TABLET (50 MCG) BY MOUTH DAILY    Thyroid Protocol Passed    12/16/2017  1:06 AM       Passed - Patient is 12 years or older       Passed - Recent or future visit with authorizing provider's specialty    Patient had office visit in the last year or has a visit in the next 30 days with authorizing provider.  See chart review.              Passed - Normal TSH on file in past 12 months    Recent Labs   Lab Test  03/03/17   1501   TSH  2.63             Passed - No active pregnancy on record    If patient is pregnant or has had a positive pregnancy test, please check TSH.         Passed - No positive pregnancy test in past 12 months    If patient is pregnant or has had a positive pregnancy test, please check TSH.

## 2017-12-19 RX ORDER — LEVOTHYROXINE SODIUM 50 UG/1
TABLET ORAL
Qty: 90 TABLET | Refills: 0 | Status: SHIPPED | OUTPATIENT
Start: 2017-12-19 | End: 2018-03-22

## 2017-12-19 NOTE — TELEPHONE ENCOUNTER
Requested Prescriptions   Pending Prescriptions Disp Refills     escitalopram (LEXAPRO) 20 MG tablet  Last Written Prescription Date:  7/6/174  Last Fill Quantity: 90,  # refills: 1   Last Office Visit with [facility], [facility] or [facility] prescribing provider:  10/10/17   Future Office Visit:      90 tablet 1     Sig: Take 1 tablet (20 mg) by mouth daily    SSRIs Protocol Passed    12/18/2017  1:37 PM       Passed - Medication is NOT Bupropion    If the medication is Bupropion (Wellbutrin), and the patient is taking for smoking cessation; OK to refill.         Passed - Patient is age 18 or older       Passed - No active pregnancy on record       Passed - No positive pregnancy test in last 12 months

## 2017-12-20 RX ORDER — ESCITALOPRAM OXALATE 20 MG/1
20 TABLET ORAL DAILY
Qty: 90 TABLET | Refills: 1 | Status: SHIPPED | OUTPATIENT
Start: 2017-12-20 | End: 2018-05-01

## 2018-03-22 DIAGNOSIS — E03.9 ACQUIRED HYPOTHYROIDISM: ICD-10-CM

## 2018-03-22 RX ORDER — LEVOTHYROXINE SODIUM 50 UG/1
TABLET ORAL
Qty: 30 TABLET | Refills: 0 | Status: SHIPPED | OUTPATIENT
Start: 2018-03-22 | End: 2018-04-18

## 2018-03-22 NOTE — TELEPHONE ENCOUNTER
"Requested Prescriptions   Pending Prescriptions Disp Refills     levothyroxine (SYNTHROID/LEVOTHROID) 50 MCG tablet [Pharmacy Med Name: LEVOTHYROXINE 50 MCG TABLET] 90 tablet 0    Last Written Prescription Date:  12/19/17  Last Fill Quantity: 90,  # refills: 0   Last office visit: 10/10/2017 with prescribing provider:  10/10/17   Future Office Visit:     Sig: TAKE 1 TABLET BY MOUTH DAILY    Thyroid Protocol Failed    3/22/2018 12:23 PM       Failed - Normal TSH on file in past 12 months    Recent Labs   Lab Test  03/03/17   1501   TSH  2.63             Passed - Patient is 12 years or older       Passed - Recent (12 mo) or future (30 days) visit within the authorizing provider's specialty    Patient had office visit in the last 12 months or has a visit in the next 30 days with authorizing provider or within the authorizing provider's specialty.  See \"Patient Info\" tab in inbasket, or \"Choose Columns\" in Meds & Orders section of the refill encounter.           Passed - No active pregnancy on record    If patient is pregnant or has had a positive pregnancy test, please check TSH.         Passed - No positive pregnancy test in past 12 months    If patient is pregnant or has had a positive pregnancy test, please check TSH.            "

## 2018-03-22 NOTE — TELEPHONE ENCOUNTER
Needs fasting OV with provider for thyroid recheck.  Routing to team to call and schedule    Anaya KENTRN BSN  [facility]  [phone]  [facility]   [phone]

## 2018-03-22 NOTE — LETTER
[place] [id number] [place] [address]   [phone]      April 3, 2018    Suni Mckeon  [address]  [place] [address]            Dear Ms. Mckeon    Your physician requires an office visit every 12 months in order to monitor your maintenance medication(s).  Your last office visit was on 3/3/17 for your thyroid.  We have called into the pharmacy a 1 month refill of your medication(s) until you can be seen by your provider.  Please call the clinic at [phone] to schedule an appointment..        Sincerely,    [facility]

## 2018-04-18 DIAGNOSIS — E03.9 ACQUIRED HYPOTHYROIDISM: ICD-10-CM

## 2018-04-18 NOTE — TELEPHONE ENCOUNTER
Routing refill request to provider for review/approval because:  Madelaine given x1 and patient did not follow up, please advise  Labs not current:  TSH  Patient was sent my chart notifying need for appointment in which was read.  Jena Flores RN - Triage  [facility]

## 2018-04-18 NOTE — TELEPHONE ENCOUNTER
"Requested Prescriptions   Pending Prescriptions Disp Refills     levothyroxine (SYNTHROID/LEVOTHROID) 50 MCG tablet [Pharmacy Med Name: LEVOTHYROXINE 50 MCG TABLET] 30 tablet 0    Last Written Prescription Date:  3/22/18  Last Fill Quantity: 30,  # refills: 0   Last office visit: 10/10/2017 with prescribing provider:  10/10/17   Future Office Visit:     Sig: TAKE 1 TABLET BY MOUTH DAILY    Thyroid Protocol Failed    4/18/2018  1:49 AM       Failed - Normal TSH on file in past 12 months    Recent Labs   Lab Test  03/03/17   1501   TSH  2.63             Passed - Patient is 12 years or older       Passed - Recent (12 mo) or future (30 days) visit within the authorizing provider's specialty    Patient had office visit in the last 12 months or has a visit in the next 30 days with authorizing provider or within the authorizing provider's specialty.  See \"Patient Info\" tab in inbasket, or \"Choose Columns\" in Meds & Orders section of the refill encounter.           Passed - No active pregnancy on record    If patient is pregnant or has had a positive pregnancy test, please check TSH.         Passed - No positive pregnancy test in past 12 months    If patient is pregnant or has had a positive pregnancy test, please check TSH.            "

## 2018-04-19 RX ORDER — LEVOTHYROXINE SODIUM 50 UG/1
TABLET ORAL
Qty: 30 TABLET | Refills: 0 | Status: SHIPPED | OUTPATIENT
Start: 2018-04-19 | End: 2018-06-10

## 2018-04-19 NOTE — TELEPHONE ENCOUNTER
Routing to team to inform and assist in scheduling.   Brynn Gonzales RN   [facility] - Triage

## 2018-05-01 ENCOUNTER — OFFICE VISIT (OUTPATIENT)
Dept: FAMILY MEDICINE | Facility: CLINIC | Age: 53
End: 2018-05-01
Payer: COMMERCIAL

## 2018-05-01 VITALS
BODY MASS INDEX: 30.46 KG/M2 | TEMPERATURE: 98.4 F | DIASTOLIC BLOOD PRESSURE: 87 MMHG | RESPIRATION RATE: 14 BRPM | SYSTOLIC BLOOD PRESSURE: 128 MMHG | WEIGHT: 182.8 LBS | OXYGEN SATURATION: 97 % | HEIGHT: 65 IN | HEART RATE: 71 BPM

## 2018-05-01 DIAGNOSIS — Z12.31 VISIT FOR SCREENING MAMMOGRAM: ICD-10-CM

## 2018-05-01 DIAGNOSIS — E66.3 OVERWEIGHT: ICD-10-CM

## 2018-05-01 DIAGNOSIS — E03.9 ACQUIRED HYPOTHYROIDISM: ICD-10-CM

## 2018-05-01 DIAGNOSIS — F41.9 ANXIETY: ICD-10-CM

## 2018-05-01 DIAGNOSIS — F32.1 MAJOR DEPRESSIVE DISORDER, SINGLE EPISODE, MODERATE (H): ICD-10-CM

## 2018-05-01 DIAGNOSIS — Z11.59 NEED FOR HEPATITIS C SCREENING TEST: ICD-10-CM

## 2018-05-01 DIAGNOSIS — Z00.00 ENCOUNTER FOR ROUTINE ADULT HEALTH EXAMINATION WITHOUT ABNORMAL FINDINGS: Primary | ICD-10-CM

## 2018-05-01 DIAGNOSIS — Z13.6 CARDIOVASCULAR SCREENING; LDL GOAL LESS THAN 100: ICD-10-CM

## 2018-05-01 DIAGNOSIS — Z12.4 SCREENING FOR MALIGNANT NEOPLASM OF CERVIX: ICD-10-CM

## 2018-05-01 PROBLEM — D23.5 BENIGN NEOPLASM OF SKIN OF TRUNK, EXCEPT SCROTUM: Status: ACTIVE | Noted: 2018-05-01

## 2018-05-01 PROBLEM — M25.562 LEFT KNEE PAIN: Status: ACTIVE | Noted: 2018-05-01

## 2018-05-01 LAB
ANION GAP SERPL CALCULATED.3IONS-SCNC: 6 MMOL/L (ref 3–14)
BUN SERPL-MCNC: 17 MG/DL (ref 7–30)
CALCIUM SERPL-MCNC: 9 MG/DL (ref 8.5–10.1)
CHLORIDE SERPL-SCNC: 108 MMOL/L (ref 94–109)
CHOLEST SERPL-MCNC: 181 MG/DL
CO2 SERPL-SCNC: 28 MMOL/L (ref 20–32)
CREAT SERPL-MCNC: 0.84 MG/DL (ref 0.52–1.04)
GFR SERPL CREATININE-BSD FRML MDRD: 70 ML/MIN/1.7M2
GLUCOSE SERPL-MCNC: 86 MG/DL (ref 70–99)
HDLC SERPL-MCNC: 50 MG/DL
LDLC SERPL CALC-MCNC: 121 MG/DL
NONHDLC SERPL-MCNC: 131 MG/DL
POTASSIUM SERPL-SCNC: 4.1 MMOL/L (ref 3.4–5.3)
SODIUM SERPL-SCNC: 142 MMOL/L (ref 133–144)
TRIGL SERPL-MCNC: 49 MG/DL
TSH SERPL DL<=0.005 MIU/L-ACNC: 3.85 MU/L (ref 0.4–4)

## 2018-05-01 PROCEDURE — G0145 SCR C/V CYTO,THINLAYER,RESCR: HCPCS | Performed by: PHYSICIAN ASSISTANT

## 2018-05-01 PROCEDURE — 80048 BASIC METABOLIC PNL TOTAL CA: CPT | Performed by: PHYSICIAN ASSISTANT

## 2018-05-01 PROCEDURE — 86803 HEPATITIS C AB TEST: CPT | Performed by: PHYSICIAN ASSISTANT

## 2018-05-01 PROCEDURE — 80061 LIPID PANEL: CPT | Performed by: PHYSICIAN ASSISTANT

## 2018-05-01 PROCEDURE — 84443 ASSAY THYROID STIM HORMONE: CPT | Performed by: PHYSICIAN ASSISTANT

## 2018-05-01 PROCEDURE — 36415 COLL VENOUS BLD VENIPUNCTURE: CPT | Performed by: PHYSICIAN ASSISTANT

## 2018-05-01 PROCEDURE — 87624 HPV HI-RISK TYP POOLED RSLT: CPT | Performed by: PHYSICIAN ASSISTANT

## 2018-05-01 PROCEDURE — 99396 PREV VISIT EST AGE 40-64: CPT | Performed by: PHYSICIAN ASSISTANT

## 2018-05-01 RX ORDER — ESCITALOPRAM OXALATE 20 MG/1
20 TABLET ORAL DAILY
Qty: 90 TABLET | Refills: 1 | Status: SHIPPED | OUTPATIENT
Start: 2018-05-01 | End: 2018-09-18

## 2018-05-01 RX ORDER — NORTRIPTYLINE HYDROCHLORIDE 50 MG/1
CAPSULE ORAL
Qty: 180 CAPSULE | Refills: 1 | Status: SHIPPED | OUTPATIENT
Start: 2018-05-01 | End: 2018-09-18

## 2018-05-01 RX ORDER — LEVOTHYROXINE SODIUM 50 UG/1
50 TABLET ORAL DAILY
Qty: 30 TABLET | Refills: 0 | Status: CANCELLED | OUTPATIENT
Start: 2018-05-01

## 2018-05-01 ASSESSMENT — ANXIETY QUESTIONNAIRES
7. FEELING AFRAID AS IF SOMETHING AWFUL MIGHT HAPPEN: NOT AT ALL
IF YOU CHECKED OFF ANY PROBLEMS ON THIS QUESTIONNAIRE, HOW DIFFICULT HAVE THESE PROBLEMS MADE IT FOR YOU TO DO YOUR WORK, TAKE CARE OF THINGS AT HOME, OR GET ALONG WITH OTHER PEOPLE: NOT DIFFICULT AT ALL
6. BECOMING EASILY ANNOYED OR IRRITABLE: NOT AT ALL
2. NOT BEING ABLE TO STOP OR CONTROL WORRYING: NOT AT ALL
GAD7 TOTAL SCORE: 0
1. FEELING NERVOUS, ANXIOUS, OR ON EDGE: NOT AT ALL
5. BEING SO RESTLESS THAT IT IS HARD TO SIT STILL: NOT AT ALL
3. WORRYING TOO MUCH ABOUT DIFFERENT THINGS: NOT AT ALL

## 2018-05-01 ASSESSMENT — PATIENT HEALTH QUESTIONNAIRE - PHQ9: 5. POOR APPETITE OR OVEREATING: NOT AT ALL

## 2018-05-01 NOTE — NURSING NOTE
"Chief Complaint   Patient presents with     Physical     Fasting       Initial /87  Pulse 71  Temp 98.4  F (36.9  C) (Tympanic)  Resp 14  Ht 5' 4.5\" (1.638 m)  Wt 182 lb 12.8 oz (82.9 kg)  LMP 12/13/2016 (LMP Unknown)  SpO2 97%  BMI 30.89 kg/m2 Estimated body mass index is 30.89 kg/(m^2) as calculated from the following:    Height as of this encounter: 5' 4.5\" (1.638 m).    Weight as of this encounter: 182 lb 12.8 oz (82.9 kg).  Medication Reconciliation: complete    Jada Alexander MA  "

## 2018-05-01 NOTE — PROGRESS NOTES
SUBJECTIVE:   CC: Suni Mckeon is an 52 year old woman who presents for preventive health visit.     Physical   Annual:     Getting at least 3 servings of Calcium per day::  Yes    Bi-annual eye exam::  Yes    Dental care twice a year::  Yes    Sleep apnea or symptoms of sleep apnea::  None    Diet::  Regular (no restrictions)    Frequency of exercise::  1 day/week    Duration of exercise::  30-45 minutes    Taking medications regularly::  Yes    Medication side effects::  None    Additional concerns today::  YES                  Today's PHQ-2 Score:   PHQ-2 ( 1999 [employer]) 5/1/2018   Q1: Little interest or pleasure in doing things 0   Q2: Feeling down, depressed or hopeless 0   PHQ-2 Score 0   Q1: Little interest or pleasure in doing things -   Q2: Feeling down, depressed or hopeless -   PHQ-2 Score -       Abuse: Current or Past(Physical, Sexual or Emotional)- No  Do you feel safe in your environment - Yes    Social History   Substance Use Topics     Smoking status: Never Smoker     Smokeless tobacco: Never Used     Alcohol use 0.0 - 1.2 oz/week     0 - 2 Standard drinks or equivalent per week      Comment: 2-3 drinks per mth     Alcohol Use 4/28/2018   If you drink alcohol do you typically have greater than 3 drinks per day OR greater than 7 drinks per week? No   No flowsheet data found.    Reviewed orders with patient.  Reviewed health maintenance and updated orders accordingly - Yes  Patient Active Problem List   Diagnosis     CARDIOVASCULAR SCREENING; LDL GOAL LESS THAN 160     Anxiety     Major depressive disorder, single episode, moderate (H)     Acquired hypothyroidism     Overweight     Past Surgical History:   Procedure Laterality Date     COLONOSCOPY N/A 1/27/2017    NL, repeat 10 years     COLONOSCOPY N/A 1/30/2017    Procedure: COLONOSCOPY;  Surgeon: Dahiana Osuna MD;  Location:  GI     SURGICAL HISTORY OF -   2005    Exc. benign lesion left lower eyelid       Social History   Substance  Use Topics     Smoking status: Never Smoker     Smokeless tobacco: Never Used     Alcohol use 0.0 - 1.2 oz/week     0 - 2 Standard drinks or equivalent per week      Comment: 2-3 drinks per mth     Family History   Problem Relation Age of Onset     Lipids Father      Psychotic Disorder Maternal Grandmother      depression     DIABETES Paternal Grandmother          Current Outpatient Prescriptions   Medication Sig Dispense Refill     escitalopram (LEXAPRO) 20 MG tablet Take 1 tablet (20 mg) by mouth daily 90 tablet 1     levothyroxine (SYNTHROID/LEVOTHROID) 50 MCG tablet TAKE 1 TABLET BY MOUTH DAILY 30 tablet 0     nortriptyline (PAMELOR) 50 MG capsule TAKE TWO CAPSULES BY MOUTH DAILY AT BEDTIME 180 capsule 1     [DISCONTINUED] escitalopram (LEXAPRO) 20 MG tablet Take 1 tablet (20 mg) by mouth daily 90 tablet 1     [DISCONTINUED] nortriptyline (PAMELOR) 50 MG capsule TAKE TWO CAPSULES BY MOUTH DAILY AT BEDTIME 180 capsule 1     Allergies   Allergen Reactions     Bupropion Itching     Tobramycin Visual Disturbance     used for pink eye and eyes became more irritated, burning and itchy     Recent Labs   Lab Test  03/03/17   1501  10/24/16   1847   07/09/14   0835  04/13/12   1240   LDL   --    --    --   125  127   HDL   --    --    --   42*  41*   TRIG   --    --    --   77  85   ALT   --    --    --   24  17   CR   --    --    --   0.72  0.63   GFRESTIMATED   --    --    --   86  >90   GFRESTBLACK   --    --    --   >90  >90   POTASSIUM   --    --    --   4.0  4.0   TSH  2.63  5.94*   < >  4.40  4.05    < > = values in this interval not displayed.        Patient over age 50, mutual decision to screen reflected in health maintenance.    Pertinent mammograms are reviewed under the imaging tab.  History of abnormal Pap smear: NO - age 30-65 PAP every 5 years with negative HPV co-testing recommended    Reviewed and updated as needed this visit by clinical staff  Tobacco  Allergies  Meds  Med Hx  Soc Hx     "    Reviewed and updated as needed this visit by Provider  Tobacco  Med Hx  Soc Hx       Past Medical History:   Diagnosis Date     Anxiety      Irritable bowel syndrome     constipation     Left knee pain      Moderate major depression (H)      Pyelonephritis, unspecified      Recurrent cold sores     Lysine     Thyroid disease       Past Surgical History:   Procedure Laterality Date     COLONOSCOPY N/A 2017    NL, repeat 10 years     COLONOSCOPY N/A 2017    Procedure: COLONOSCOPY;  Surgeon: Dahiana Osuna MD;  Location:  GI     SURGICAL HISTORY OF -       Exc. benign lesion left lower eyelid     Obstetric History       T2      L2     SAB0   TAB0   Ectopic0   Multiple0   Live Births0       # Outcome Date GA Lbr Lon/2nd Weight Sex Delivery Anes PTL Lv   2 Term            1 Term                   Review of Systems  CONSTITUTIONAL: NEGATIVE for fever, chills, change in weight  INTEGUMENTARY/SKIN: NEGATIVE for worrisome rashes, moles or lesions  EYES: NEGATIVE for vision changes or irritation  ENT: NEGATIVE for ear, mouth and throat problems  RESP: NEGATIVE for significant cough or SOB  BREAST: NEGATIVE for masses, tenderness or discharge  CV: NEGATIVE for chest pain, palpitations or peripheral edema  GI: NEGATIVE for nausea, abdominal pain, heartburn, or change in bowel habits  : NEGATIVE for unusual urinary or vaginal symptoms. No vaginal bleeding.  MUSCULOSKELETAL: NEGATIVE for significant arthralgias or myalgia  NEURO: NEGATIVE for weakness, dizziness or paresthesias  PSYCHIATRIC: NEGATIVE for changes in mood or affect      OBJECTIVE:   /87  Pulse 71  Temp 98.4  F (36.9  C) (Tympanic)  Resp 14  Ht 5' 4.5\" (1.638 m)  Wt 182 lb 12.8 oz (82.9 kg)  LMP 2016 (LMP Unknown)  SpO2 97%  BMI 30.89 kg/m2  Physical Exam  GENERAL: healthy, alert and no distress  EYES: Eyes grossly normal to inspection, PERRL and conjunctivae and sclerae normal  HENT: ear canals " and TM's normal, nose and mouth without ulcers or lesions  NECK: no adenopathy, no asymmetry, masses, or scars and thyroid normal to palpation  RESP: lungs clear to auscultation - no rales, rhonchi or wheezes  BREAST: normal without masses, tenderness or nipple discharge and no palpable axillary masses or adenopathy  CV: regular rate and rhythm, normal S1 S2, no S3 or S4, no murmur, click or rub  ABDOMEN: soft, nontender, no hepatosplenomegaly, no masses and bowel sounds normal   (female): normal female external genitalia, normal urethral meatus, vaginal mucosa, normal cervix/adnexa/uterus without masses or discharge  MS: no gross musculoskeletal defects noted, no edema  SKIN:few grayish, waxy appearing lesions on thigh bilaterally consistent with seborrheic keratosis  NEURO: Normal strength and tone, mentation intact and speech normal  PSYCH: mentation appears normal, affect normal/bright    ASSESSMENT/PLAN:   1. Encounter for routine adult health examination without abnormal findings  - Basic metabolic panel    2. Anxiety  - escitalopram (LEXAPRO) 20 MG tablet; Take 1 tablet (20 mg) by mouth daily  Dispense: 90 tablet; Refill: 1    3. Major depressive disorder, single episode, moderate (H)  Well controlled  - escitalopram (LEXAPRO) 20 MG tablet; Take 1 tablet (20 mg) by mouth daily  Dispense: 90 tablet; Refill: 1  - nortriptyline (PAMELOR) 50 MG capsule; TAKE TWO CAPSULES BY MOUTH DAILY AT BEDTIME  Dispense: 180 capsule; Refill: 1    4. Overweight  Working on diet and exercise, will continue to monitor    5. Acquired hypothyroidism  - TSH with free T4 reflex    6. Visit for screening mammogram  - MA SCREENING DIGITAL BILAT - Future  (s+30); Future    7. Screening for malignant neoplasm of cervix  - Pap imaged thin layer screen with HPV - recommended age 30 - 65 years (select HPV order below)  - HPV High Risk Types DNA Cervical    8. Need for hepatitis C screening test  - Hepatitis C Screen Reflex to HCV RNA  "Quant and Genotype    9. CARDIOVASCULAR SCREENING; LDL GOAL LESS THAN 100  - Lipid Profile (Chol, Trig, HDL, LDL calc)    COUNSELING:  Reviewed preventive health counseling, as reflected in patient instructions       Regular exercise       Healthy diet/nutrition         reports that she has never smoked. She has never used smokeless tobacco.    Estimated body mass index is 30.89 kg/(m^2) as calculated from the following:    Height as of this encounter: 5' 4.5\" (1.638 m).    Weight as of this encounter: 182 lb 12.8 oz (82.9 kg).   Weight management plan: Discussed healthy diet and exercise guidelines and patient will follow up in 12 months in clinic to re-evaluate.    Counseling Resources:  ATP IV Guidelines  Pooled Cohorts Equation Calculator  Breast Cancer Risk Calculator  FRAX Risk Assessment  ICSI Preventive Guidelines  Dietary Guidelines for Americans, 2010  USDA's MyPlate  ASA Prophylaxis  Lung CA Screening    Edis Yoon PA-C  [facility] [place]  Answers for HPI/ROS submitted by the patient on 4/28/2018   PHQ-2 Score: 0    "

## 2018-05-01 NOTE — LETTER
My Depression Action Plan  Name: Suni Mckeon   Date of Birth 1965  Date: 5/1/2018    My doctor: Edis Yoon   My clinic: [address] [address]  [phone]          GREEN    ZONE   Good Control    What it looks like:     Things are going generally well. You have normal up s and down s. You may even feel depressed from time to time, but bad moods usually last less than a day.   What you need to do:  1. Continue to care for yourself (see self care plan)  2. Check your depression survival kit and update it as needed  3. Follow your physician s recommendations including any medication.  4. Do not stop taking medication unless you consult with your physician first.           YELLOW         ZONE Getting Worse    What it looks like:     Depression is starting to interfere with your life.     It may be hard to get out of bed; you may be starting to isolate yourself from others.    Symptoms of depression are starting to last most all day and this has happened for several days.     You may have suicidal thoughts but they are not constant.   What you need to do:     1. Call your care team, your response to treatment will improve if you keep your care team informed of your progress. Yellow periods are signs an adjustment may need to be made.     2. Continue your self-care, even if you have to fake it!    3. Talk to someone in your support network    4. Open up your depression survival kit           RED    ZONE Medical Alert - Get Help    What it looks like:     Depression is seriously interfering with your life.     You may experience these or other symptoms: You can t get out of bed most days, can t work or engage in other necessary activities, you have trouble taking care of basic hygiene, or basic responsibilities, thoughts of suicide or death that will not go away, self-injurious behavior.     What you need to do:  1. Call your  care team and request a same-day appointment. If they are not available (weekends or after hours) call your local crisis line, emergency room or 911.            Depression Self Care Plan / Survival Kit    Self-Care for Depression  Here s the deal. Your body and mind are really not as separate as most people think.  What you do and think affects how you feel and how you feel influences what you do and think. This means if you do things that people who feel good do, it will help you feel better.  Sometimes this is all it takes.  There is also a place for medication and therapy depending on how severe your depression is, so be sure to consult with your medical provider and/ or Behavioral Health Consultant if your symptoms are worsening or not improving.     In order to better manage my stress, I will:    Exercise  Get some form of exercise, every day. This will help reduce pain and release endorphins, the  feel good  chemicals in your brain. This is almost as good as taking antidepressants!  This is not the same as joining a gym and then never going! (they count on that by the way ) It can be as simple as just going for a walk or doing some gardening, anything that will get you moving.      Hygiene   Maintain good hygiene (Get out of bed in the morning, Make your bed, Brush your teeth, Take a shower, and Get dressed like you were going to work, even if you are unemployed).  If your clothes don't fit try to get ones that do.    Diet  I will strive to eat foods that are good for me, drink plenty of water, and avoid excessive sugar, caffeine, alcohol, and other mood-altering substances.  Some foods that are helpful in depression are: complex carbohydrates, B vitamins, flaxseed, fish or fish oil, fresh fruits and vegetables.    Psychotherapy  I agree to participate in Individual Therapy (if recommended).    Medication  If prescribed medications, I agree to take them.  Missing doses can result in serious side effects.  I  understand that drinking alcohol, or other illicit drug use, may cause potential side effects.  I will not stop my medication abruptly without first discussing it with my provider.    Staying Connected With Others  I will stay in touch with my friends, family members, and my primary care provider/team.    Use your imagination  Be creative.  We all have a creative side; it doesn t matter if it s oil painting, sand castles, or mud pies! This will also kick up the endorphins.    Witness Beauty  (AKA stop and smell the roses) Take a look outside, even in mid-winter. Notice colors, textures. Watch the squirrels and birds.     Service to others  Be of service to others.  There is always someone else in need.  By helping others we can  get out of ourselves  and remember the really important things.  This also provides opportunities for practicing all the other parts of the program.    Humor  Laugh and be silly!  Adjust your TV habits for less news and crime-drama and more comedy.    Control your stress  Try breathing deep, massage therapy, biofeedback, and meditation. Find time to relax each day.     My support system    Clinic Contact:  Phone number:    Contact 1:  Phone number:    Contact 2:  Phone number:    Jew/:  Phone number:    Therapist:  Phone number:    Local crisis center:    Phone number:    Other community support:  Phone number:

## 2018-05-01 NOTE — MR AVS SNAPSHOT
After Visit Summary   5/1/2018    Suni Mckeon    MRN: [id number]           Patient Information     Date Of Birth          1965        Visit Information        Provider Department      5/1/2018 8:00 AM Edis Yoon PA-C [facility] [place]        Today's Diagnoses     Encounter for routine adult health examination without abnormal findings    -  1    Anxiety        Major depressive disorder, single episode, moderate (H)        Overweight        Acquired hypothyroidism        Visit for screening mammogram        Screening for malignant neoplasm of cervix        Need for hepatitis C screening test        CARDIOVASCULAR SCREENING; LDL GOAL LESS THAN 100           Follow-ups after your visit        Follow-up notes from your care team     Return in about 6 months (around 11/1/2018) for anxiety follow up.      Future tests that were ordered for you today     Open Future Orders        Priority Expected Expires Ordered    MA SCREENING DIGITAL BILAT - Future  (s+30) Routine  5/1/2019 5/1/2018            Who to contact     If you have questions or need follow up information about today's clinic visit or your schedule please contact [facility] directly at [phone].  Normal or non-critical lab and imaging results will be communicated to you by Teach 'n Gohart, letter or phone within 4 business days after the clinic has received the results. If you do not hear from us within 7 days, please contact the clinic through Teach 'n Gohart or phone. If you have a critical or abnormal lab result, we will notify you by phone as soon as possible.  Submit refill requests through Insuritas or call your pharmacy and they will forward the refill request to us. Please allow 3 business days for your refill to be completed.          Additional Information About Your Visit        MyChart Information     Insur[employer] gives you secure access to your electronic health record. If you see a primary care  "provider, you can also send messages to your care team and make appointments. If you have questions, please call your primary care clinic.  If you do not have a primary care provider, please call [phone] and they will assist you.        Care EveryWhere ID     This is your Care EveryWhere ID. This could be used by other organizations to access your [place] medical records  [id number]        Your Vitals Were     Pulse Temperature Respirations Height Last Period Pulse Oximetry    71 98.4  F (36.9  C) (Tympanic) 14 5' 4.5\" (1.638 m) 12/13/2016 (LMP Unknown) 97%    BMI (Body Mass Index)                   30.89 kg/m2            Blood Pressure from Last 3 Encounters:   05/01/18 128/87   10/10/17 124/80   01/30/17 112/77    Weight from Last 3 Encounters:   05/01/18 182 lb 12.8 oz (82.9 kg)   10/10/17 185 lb 12.8 oz (84.3 kg)   01/27/17 175 lb (79.4 kg)              We Performed the Following     Basic metabolic panel     DEPRESSION ACTION PLAN (DAP)     Hepatitis C Screen Reflex to HCV RNA Quant and Genotype     HPV High Risk Types DNA Cervical     Lipid Profile (Chol, Trig, HDL, LDL calc)     Pap imaged thin layer screen with HPV - recommended age 30 - 65 years (select HPV order below)     TSH with free T4 reflex          Today's Medication Changes          These changes are accurate as of 5/1/18  8:50 AM.  If you have any questions, ask your nurse or doctor.               These medicines have changed or have updated prescriptions.        Dose/Directions    nortriptyline 50 MG capsule   Commonly known as:  PAMELOR   This may have changed:  See the new instructions.   Used for:  Major depressive disorder, single episode, moderate (H)   Changed by:  Edis Yoon PA-C        TAKE TWO CAPSULES BY MOUTH DAILY AT BEDTIME   Quantity:  180 capsule   Refills:  1            Where to get your medicines      These medications were sent to [facility] [address] [place], [place] - [address]  [address], [place] [address]     Phone:  [phone]     escitalopram 20 MG tablet    nortriptyline 50 MG capsule                Primary Care Provider Office Phone # Fax #    Edis Yoon PA-C [phone]       [address]  [place] [address]        Equal Access to Services     [facility]: Hadii aad ku hadasho Soomaali, waaxda luqadaha, qaybta kaalmada adeegyada, waxay idiin hayaan adeeg kharash la'aan ah. So [facility] [phone].    ATENCIÓN: Si habla español, tiene a may disposición servicios gratuitos de asistencia lingüística. [facility] [phone].    We comply with applicable federal civil rights laws and [place] laws. We do not discriminate on the basis of race, color, national origin, age, disability, sex, sexual orientation, or gender identity.            Thank you!     [facility]. Our goal is always to provide you with excellent care. Hearing back from our patients is one way we can continue to improve our services. Please take a few minutes to complete the written survey that you may receive in the mail after your visit with us. Thank you!             Your Updated Medication List - Protect others around you: Learn how to safely use, store and throw away your medicines at www.disposemymeds.org.          This list is accurate as of 5/1/18  8:50 AM.  Always use your most recent med list.                   Brand Name Dispense Instructions for use Diagnosis    escitalopram 20 MG tablet    LEXAPRO    90 tablet    Take 1 tablet (20 mg) by mouth daily    Anxiety, Major depressive disorder, single episode, moderate (H)       levothyroxine 50 MCG tablet    SYNTHROID/LEVOTHROID    30 tablet    TAKE 1 TABLET BY MOUTH DAILY    Acquired hypothyroidism       nortriptyline 50 MG capsule    PAMELOR    180 capsule    TAKE TWO CAPSULES BY MOUTH DAILY AT BEDTIME    Major depressive disorder, single episode, moderate (H)

## 2018-05-01 NOTE — LETTER
June 11, 2018      Suni HUTCHINS Mike  [address]  [place] [address]        Dear ,    We are writing to inform you of your test results.    -Kidney function is normal (Cr, GFR), Sodium is normal, Potassium is normal, Calcium is normal, Glucose is normal   -LDL(bad) cholesterol level is elevated,  A diet high in fat and simple carbohydrates, genetics and being overweight can contribute to this. ADVISE: Exercise, a low fat, low carbohydrate diet and weight control are helpful to improve this.  Rechecking your fasting cholesterol panel in 12 months is recommended   -TSH (thyroid stimulating hormone) level is normal which indicates normal thyroid function.  -Hepatitis C antibody screen test shows no signs of a previous hepatitis C infection.    If you have any questions please do not hesitate to contact our office via phone ([phone]) or you may send me a message via Ceon by clicking the contact my Care Team link.      Resulted Orders   Hepatitis C Screen Reflex to HCV RNA Quant and Genotype   Result Value Ref Range    Hepatitis C Antibody Nonreactive NR^Nonreactive      Comment:      Assay performance characteristics have not been established for newborns,   infants, and children     TSH with free T4 reflex   Result Value Ref Range    TSH 3.85 0.40 - 4.00 mU/L   Lipid Profile (Chol, Trig, HDL, LDL calc)   Result Value Ref Range    Cholesterol 181 <200 mg/dL    Triglycerides 49 <150 mg/dL      Comment:      Fasting specimen    HDL Cholesterol 50 >49 mg/dL    LDL Cholesterol Calculated 121 (H) <100 mg/dL      Comment:      Above desirable:  100-129 mg/dl  Borderline High:  130-159 mg/dL  High:             160-189 mg/dL  Very high:       >189 mg/dl      Non HDL Cholesterol 131 (H) <130 mg/dL      Comment:      Above Desirable:  130-159 mg/dl  Borderline high:  160-189 mg/dl  High:             190-219 mg/dl  Very high:       >219 mg/dl     Basic metabolic panel   Result Value Ref Range     Sodium 142 133 - 144 mmol/L    Potassium 4.1 3.4 - 5.3 mmol/L    Chloride 108 94 - 109 mmol/L    Carbon Dioxide 28 20 - 32 mmol/L    Anion Gap 6 3 - 14 mmol/L    Glucose 86 70 - 99 mg/dL      Comment:      Fasting specimen    Urea Nitrogen 17 7 - 30 mg/dL    Creatinine 0.84 0.52 - 1.04 mg/dL    GFR Estimate 70 >60 mL/min/1.7m2      Comment:      Non  GFR Calc    GFR Estimate If Black 85 >60 mL/min/1.7m2      Comment:       GFR Calc    Calcium 9.0 8.5 - 10.1 mg/dL           Sincerely,        Edis Yoon PA-C

## 2018-05-02 LAB — HCV AB SERPL QL IA: NONREACTIVE

## 2018-05-02 ASSESSMENT — ANXIETY QUESTIONNAIRES: GAD7 TOTAL SCORE: 0

## 2018-05-02 ASSESSMENT — PATIENT HEALTH QUESTIONNAIRE - PHQ9: SUM OF ALL RESPONSES TO PHQ QUESTIONS 1-9: 0

## 2018-05-03 NOTE — PROGRESS NOTES
This patients labs are normal.  Please send them letter with normal results.        -Kidney function is normal (Cr, GFR), Sodium is normal, Potassium is normal, Calcium is normal, Glucose is normal   -LDL(bad) cholesterol level is elevated,  A diet high in fat and simple carbohydrates, genetics and being overweight can contribute to this. ADVISE: Exercise, a low fat, low carbohydrate diet and weight control are helpful to improve this.  Rechecking your fasting cholesterol panel in 12 months is recommended   -TSH (thyroid stimulating hormone) level is normal which indicates normal thyroid function.  -Hepatitis C antibody screen test shows no signs of a previous hepatitis C infection.    If you have any questions please do not hesitate to contact our office via phone ([phone]) or you may send me a message via [employer] by clicking the contact my Care Team link.      It was a pleasure participating in your care!    Thank you,    Edis Yoon MPH, PA-C  [address] [place] [address] [phone]

## 2018-05-06 LAB
COPATH REPORT: NORMAL
PAP: NORMAL

## 2018-05-07 ENCOUNTER — HOSPITAL ENCOUNTER (OUTPATIENT)
Dept: MAMMOGRAPHY | Facility: CLINIC | Age: 53
Discharge: HOME | End: 2018-05-07
Attending: PHYSICIAN ASSISTANT | Admitting: PHYSICIAN ASSISTANT
Payer: COMMERCIAL

## 2018-05-07 DIAGNOSIS — Z12.31 VISIT FOR SCREENING MAMMOGRAM: ICD-10-CM

## 2018-05-07 PROCEDURE — 77067 SCR MAMMO BI INCL CAD: CPT

## 2018-05-08 LAB
FINAL DIAGNOSIS: NORMAL
HPV HR 12 DNA CVX QL NAA+PROBE: NEGATIVE
HPV16 DNA SPEC QL NAA+PROBE: NEGATIVE
HPV18 DNA SPEC QL NAA+PROBE: NEGATIVE
SPECIMEN DESCRIPTION: NORMAL
SPECIMEN SOURCE CVX/VAG CYTO: NORMAL

## 2018-09-17 ENCOUNTER — TELEPHONE (OUTPATIENT)
Dept: FAMILY MEDICINE | Facility: CLINIC | Age: 53
End: 2018-09-17

## 2018-09-17 NOTE — TELEPHONE ENCOUNTER
Pt called and stated her depression meds are not working. Appt schedule for 12:40 Tuesday with Dr Ricardo. Call sent to triage.  Angela Hale,

## 2018-09-17 NOTE — TELEPHONE ENCOUNTER
Suni Mckeon is a 53 year old female who calls with Depressive symptoms . States that she has been having difficulty concentrating at work. Increase symptoms of depression and anxiety over the last month. States that she thinks that it may have all started a month ago when she picked up a different  of nortriptyline. Patient is at work at the time of the call. States that she needs to and wants to work. States that she has been having difficulty concentrating and is concerned that it is affecting her work. States no thought of hurting herself or others. No past hospitalizations for depression. States that she has been on depression medication since she is in her 20's.     NURSING ASSESSMENT:  Description:  above  Onset/duration:  Worsening depression and anxiety the last month  Precip. factors:  Changed  of nortriptyline  Psych History: Previous diagnosis  Yes  Allergies:   Allergies   Allergen Reactions     Bupropion Itching     Tobramycin Visual Disturbance     used for pink eye and eyes became more irritated, burning and itchy       MEDICATIONS:   Taking medication(s) as prescribed? Yes  Taking over the counter medication(s)? No  Any medication side effects? possible    Any barriers to taking medication(s) as prescribed?  No  Medication(s) improving/managing symptoms?  No  Medication reconciliation completed: Yes    Complete PHQ-9 and BRUNILDA 7 -if appropriate with the presenting symptoms: No, denies SI/HI    Last visit:  5/1/18  Imminent danger to self:  no.   Imminent danger to others:  no.   Impaired reality:  no  Is patient home alone?  No, states that her  will be out of town this evening, but her sister lives 2 blocks away. Could also call her mother.  Is there someone that can be contacted to be with patient if needed? Yes      If further questions/concerns or if symptoms do not improve, worsen or new symptoms develop such as  *Suicidal thought without a plan or means to  carry out the plan  *Depression interferes with daily activities  *Persistent inability to sleep  Call your PCP or [facility] as soon as possible.     SEEK EMERGENCE[facility] CARE IMMEDIATELY IF ANY OF THE FOLLOWING OCCUR:  *Suicidal thoughts and a plan and means to carry out the plan  *Injury to self or others     Information given regarding interventions: Patient to contact friends or family for support, Increase exercise and enjoyable activities, Eat a balanced diet, drink plenty of fluids, and rest as needed and advised to call 24/7 nurse line if worsening symptoms or thoughts of suicide    NURSING PLAN: Nursing advice to patient keep appt tomorrow. Patient states that she already feels better knowing she has appt tomorrow. States that the last time she started to feel more depressed she waited 3 months to come in.    RECOMMENDED DISPOSITION:  See in 24 hours - sooner if new or worsening symptoms  Will comply with recommendation: Yes    Guideline used:  Telephone Triage Protocols for Nurses, Fourth Edition, Aniyah Arshad RN

## 2018-09-18 ENCOUNTER — OFFICE VISIT (OUTPATIENT)
Dept: FAMILY MEDICINE | Facility: CLINIC | Age: 53
End: 2018-09-18
Payer: COMMERCIAL

## 2018-09-18 VITALS
DIASTOLIC BLOOD PRESSURE: 82 MMHG | OXYGEN SATURATION: 100 % | WEIGHT: 182 LBS | HEART RATE: 83 BPM | TEMPERATURE: 98.1 F | SYSTOLIC BLOOD PRESSURE: 132 MMHG | BODY MASS INDEX: 30.32 KG/M2 | HEIGHT: 65 IN

## 2018-09-18 DIAGNOSIS — F32.1 MAJOR DEPRESSIVE DISORDER, SINGLE EPISODE, MODERATE (H): ICD-10-CM

## 2018-09-18 PROCEDURE — 99214 OFFICE O/P EST MOD 30 MIN: CPT | Performed by: FAMILY MEDICINE

## 2018-09-18 RX ORDER — NORTRIPTYLINE HYDROCHLORIDE 50 MG/1
CAPSULE ORAL
Qty: 180 CAPSULE | Refills: 1 | Status: SHIPPED | OUTPATIENT
Start: 2018-09-18 | End: 2018-09-18

## 2018-09-18 RX ORDER — NORTRIPTYLINE HYDROCHLORIDE 50 MG/1
CAPSULE ORAL
Qty: 180 CAPSULE | Refills: 1 | Status: SHIPPED | OUTPATIENT
Start: 2018-09-18 | End: 2019-02-05

## 2018-09-18 RX ORDER — VENLAFAXINE HYDROCHLORIDE 75 MG/1
75 CAPSULE, EXTENDED RELEASE ORAL DAILY
Qty: 30 CAPSULE | Refills: 1 | Status: SHIPPED | OUTPATIENT
Start: 2018-09-18 | End: 2018-10-04

## 2018-09-18 ASSESSMENT — ANXIETY QUESTIONNAIRES
3. WORRYING TOO MUCH ABOUT DIFFERENT THINGS: NEARLY EVERY DAY
6. BECOMING EASILY ANNOYED OR IRRITABLE: NEARLY EVERY DAY
1. FEELING NERVOUS, ANXIOUS, OR ON EDGE: NEARLY EVERY DAY
7. FEELING AFRAID AS IF SOMETHING AWFUL MIGHT HAPPEN: NEARLY EVERY DAY
IF YOU CHECKED OFF ANY PROBLEMS ON THIS QUESTIONNAIRE, HOW DIFFICULT HAVE THESE PROBLEMS MADE IT FOR YOU TO DO YOUR WORK, TAKE CARE OF THINGS AT HOME, OR GET ALONG WITH OTHER PEOPLE: EXTREMELY DIFFICULT
GAD7 TOTAL SCORE: 21
5. BEING SO RESTLESS THAT IT IS HARD TO SIT STILL: NEARLY EVERY DAY
2. NOT BEING ABLE TO STOP OR CONTROL WORRYING: NEARLY EVERY DAY

## 2018-09-18 ASSESSMENT — PATIENT HEALTH QUESTIONNAIRE - PHQ9: 5. POOR APPETITE OR OVEREATING: NEARLY EVERY DAY

## 2018-09-18 NOTE — MR AVS SNAPSHOT
"              After Visit Summary   9/18/2018    Suni Mckeon    MRN: [id number]           Patient Information     Date Of Birth          1965        Visit Information        Provider Department      9/18/2018 12:40 PM Lou Ricardo MD [facility] [place]        Today's Diagnoses     Major depressive disorder, single episode, moderate (H)           Follow-ups after your visit        Follow-up notes from your care team     Return in about 1 month (around 10/18/2018) for Mood Recheck.      Who to contact     If you have questions or need follow up information about today's clinic visit or your schedule please contact [facility] directly at [phone].  Normal or non-critical lab and imaging results will be communicated to you by iNeoMarketinghart, letter or phone within 4 business days after the clinic has received the results. If you do not hear from us within 7 days, please contact the clinic through iNeoMarketinghart or phone. If you have a critical or abnormal lab result, we will notify you by phone as soon as possible.  Submit refill requests through Celsense or call your pharmacy and they will forward the refill request to us. Please allow 3 business days for your refill to be completed.          Additional Information About Your Visit        MyChart Information     Cels[employer] gives you secure access to your electronic health record. If you see a primary care provider, you can also send messages to your care team and make appointments. If you have questions, please call your primary care clinic.  If you do not have a primary care provider, please call [phone] and they will assist you.        Care EveryWhere ID     This is your Care EveryWhere ID. This could be used by other organizations to access your [place] medical records  [id number]        Your Vitals Were     Pulse Temperature Height Last Period Pulse Oximetry BMI (Body Mass Index)    83 98.1  F (36.7  C) (Tympanic) 5' 4.5\" (1.638 " m) 12/13/2016 (LMP Unknown) 100% 30.76 kg/m2       Blood Pressure from Last 3 Encounters:   09/18/18 132/82   05/01/18 128/87   10/10/17 124/80    Weight from Last 3 Encounters:   09/18/18 182 lb (82.6 kg)   05/01/18 182 lb 12.8 oz (82.9 kg)   10/10/17 185 lb 12.8 oz (84.3 kg)              Today, you had the following     No orders found for display         Today's Medication Changes          These changes are accurate as of 9/18/18 12:55 PM.  If you have any questions, ask your nurse or doctor.               Start taking these medicines.        Dose/Directions    nortriptyline 50 MG capsule   Commonly known as:  PAMELOR   Used for:  Major depressive disorder, single episode, moderate (H)   Started by:  Lou Ricardo MD        TAKE TWO CAPSULES BY MOUTH DAILY AT BEDTIME   Quantity:  180 capsule   Refills:  1       venlafaxine 75 MG 24 hr capsule   Commonly known as:  EFFEXOR-XR   Used for:  Major depressive disorder, single episode, moderate (H)   Started by:  Lou Ricardo MD        Dose:  75 mg   Take 1 capsule (75 mg) by mouth daily   Quantity:  30 capsule   Refills:  1         Stop taking these medicines if you haven't already. Please contact your care team if you have questions.     escitalopram 20 MG tablet   Commonly known as:  LEXAPRO   Stopped by:  Lou Ricardo MD                Where to get your medicines      These medications were sent to [place] [address] [place] [facility] [place] - [address]  [address] [place] [address]     Phone:  [phone]     nortriptyline 50 MG capsule    venlafaxine 75 MG 24 hr capsule                Primary Care Provider Office Phone # Fax #    Edis Yoon PA-C [phone]       [address]  [place] [address]        Equal Access to Services     : Mandeep Recio, waaxda luqadaha, qaybta kaalmada adeegyabishop, karla san. So [facility] [phone].    ATENCIÓN:  Si habla lisha, tiene a may disposición servicios gratuitos de asistencia lingüística. Eduardo greenberg [phone].    We comply with applicable federal civil rights laws and [place] laws. We do not discriminate on the basis of race, color, national origin, age, disability, sex, sexual orientation, or gender identity.            Thank you!     Thank you for choosing [facility] [place]  for your care. Our goal is always to provide you with excellent care. Hearing back from our patients is one way we can continue to improve our services. Please take a few minutes to complete the written survey that you may receive in the mail after your visit with us. Thank you!             Your Updated Medication List - Protect others around you: Learn how to safely use, store and throw away your medicines at www.disposemymeds.org.          This list is accurate as of 9/18/18 12:55 PM.  Always use your most recent med list.                   Brand Name Dispense Instructions for use Diagnosis    levothyroxine 50 MCG tablet    SYNTHROID/LEVOTHROID    90 tablet    TAKE 1 TABLET BY MOUTH DAILY    Acquired hypothyroidism       nortriptyline 50 MG capsule    PAMELOR    180 capsule    TAKE TWO CAPSULES BY MOUTH DAILY AT BEDTIME    Major depressive disorder, single episode, moderate (H)       venlafaxine 75 MG 24 hr capsule    EFFEXOR-XR    30 capsule    Take 1 capsule (75 mg) by mouth daily    Major depressive disorder, single episode, moderate (H)

## 2018-09-18 NOTE — PROGRESS NOTES
SUBJECTIVE:   Suni Mckeon is a 53 year old female who presents to clinic today for the following health issues:      Depression Followup    Status since last visit: Worsened Last rx for nortriptyline was from a different  and wondering if that is why things see worse     See PHQ-9 for current symptoms.  Other associated symptoms: None    Complicating factors:   Significant life event:  No   Current substance abuse:  None  Anxiety or Panic symptoms:  No    Also using lexapro 20 mg every day.  Patient reports that in the past medications stop working for her after a little while.  She has tried therapy in the past but that does not help so therefore she is not interested in doing that at this time.    Worries that she may get fired if she do not work on improving her symptoms.    PHQ-9 10/10/2017 5/1/2018 9/18/2018   Total Score 0 0 24   Q9: Suicide Ideation Not at all Not at all Not at all     BRUNILDA-7 SCORE 10/10/2017 5/1/2018 9/18/2018   Total Score - - -   Total Score - - -   Total Score 0 0 21         PHQ-9  English  PHQ-9   Any Language  Suicide Assessment Five-step Evaluation and Treatment (SAFE-T)    Amount of exercise or physical activity: 2-3 days/week for an average of 45-60 minutes    Problems taking medications regularly: No    Medication side effects: none    Diet: regular (no restrictions)            Problem list and histories reviewed & adjusted, as indicated.  Additional history: as documented    Patient Active Problem List   Diagnosis     CARDIOVASCULAR SCREENING; LDL GOAL LESS THAN 160     Anxiety     Major depressive disorder, single episode, moderate (H)     Acquired hypothyroidism     Overweight     Left knee pain     Benign neoplasm of skin of trunk, except scrotum     Past Surgical History:   Procedure Laterality Date     COLONOSCOPY N/A 1/27/2017    NL, repeat 10 years     COLONOSCOPY N/A 1/30/2017    Procedure: COLONOSCOPY;  Surgeon: Dahiana Osuna MD;  Location:  GI  "    SURGICAL HISTORY OF -   2005    Exc. benign lesion left lower eyelid       Social History   Substance Use Topics     Smoking status: Never Smoker     Smokeless tobacco: Never Used     Alcohol use 0.0 - 1.2 oz/week     0 - 2 Standard drinks or equivalent per week      Comment: 2-3 drinks per mth     Family History   Problem Relation Age of Onset     Lipids Father      Psychotic Disorder Maternal Grandmother      depression     Diabetes Paternal Grandmother          Current Outpatient Prescriptions   Medication Sig Dispense Refill     levothyroxine (SYNTHROID/LEVOTHROID) 50 MCG tablet TAKE 1 TABLET BY MOUTH DAILY 90 tablet 3     nortriptyline (PAMELOR) 50 MG capsule TAKE TWO CAPSULES BY MOUTH DAILY AT BEDTIME 180 capsule 1     venlafaxine (EFFEXOR-XR) 75 MG 24 hr capsule Take 1 capsule (75 mg) by mouth daily 30 capsule 1     [DISCONTINUED] nortriptyline (PAMELOR) 50 MG capsule TAKE TWO CAPSULES BY MOUTH DAILY AT BEDTIME 180 capsule 1     [DISCONTINUED] nortriptyline (PAMELOR) 50 MG capsule TAKE TWO CAPSULES BY MOUTH DAILY AT BEDTIME 180 capsule 1     Allergies   Allergen Reactions     Bupropion Itching     Tobramycin Visual Disturbance     used for pink eye and eyes became more irritated, burning and itchy       Reviewed and updated as needed this visit by clinical staff  Tobacco  Allergies  Meds  Problems  Med Hx  Surg Hx  Fam Hx  Soc Hx        Reviewed and updated as needed this visit by Provider  Allergies  Meds  Problems         ROS:  CONSTITUTIONAL: NEGATIVE for fever, chills, change in weight  ENT/MOUTH: NEGATIVE for ear, mouth and throat problems  RESP: NEGATIVE for significant cough or SOB  CV: NEGATIVE for chest pain, palpitations or peripheral edema    OBJECTIVE:                                                    /82  Pulse 83  Temp 98.1  F (36.7  C) (Tympanic)  Ht 5' 4.5\" (1.638 m)  Wt 182 lb (82.6 kg)  LMP 12/13/2016 (LMP Unknown)  SpO2 100%  BMI 30.76 kg/m2  Body mass index is " 30.76 kg/(m^2).   GENERAL: healthy, alert, well nourished, well hydrated, no distress  HENT: ear canals- normal; TMs- normal; Nose- normal; Mouth- no ulcers, no lesions  NECK: no tenderness, no adenopathy, no asymmetry, no masses, no stiffness; thyroid- normal to palpation  RESP: lungs clear to auscultation - no rales, no rhonchi, no wheezes  CV: regular rates and rhythm, normal S1 S2, no S3 or S4 and no murmur, no click or rub -  ABDOMEN: soft, no tenderness, no  hepatosplenomegaly, no masses, normal bowel sounds  PSYCH: Flat affect and low mood.         ASSESSMENT/PLAN:                                                      1. Major depressive disorder, single episode, moderate (H)    Symptoms are not well controlled.  I do not believe that changing the  would cause this much worsening of symptoms all of a sudden.  I did however ordered nortriptyline to be ordered from manufacturing company that she was using in the past and it worked for her better.    Recommending to switch from Lexapro 20 mg to Effexor XR 75 mg daily.  Side effect profile reviewed.  Recommended counseling with the patient declined.  Instructed to call us back if any side effects noted or any other concerns noted.  Follow-up in 4 weeks for recheck.      - nortriptyline (PAMELOR) 50 MG capsule; TAKE TWO CAPSULES BY MOUTH DAILY AT BEDTIME  Dispense: 180 capsule; Refill: 1  - venlafaxine (EFFEXOR-XR) 75 MG 24 hr capsule; Take 1 capsule (75 mg) by mouth daily  Dispense: 30 capsule; Refill: 1        Lou Ricardo MD  [facility] [place]

## 2018-09-20 ASSESSMENT — ANXIETY QUESTIONNAIRES: GAD7 TOTAL SCORE: 21

## 2018-09-20 ASSESSMENT — PATIENT HEALTH QUESTIONNAIRE - PHQ9: SUM OF ALL RESPONSES TO PHQ QUESTIONS 1-9: 24

## 2018-10-04 ENCOUNTER — TELEPHONE (OUTPATIENT)
Dept: FAMILY MEDICINE | Facility: CLINIC | Age: 53
End: 2018-10-04

## 2018-10-04 DIAGNOSIS — F32.1 MAJOR DEPRESSIVE DISORDER, SINGLE EPISODE, MODERATE (H): ICD-10-CM

## 2018-10-04 NOTE — TELEPHONE ENCOUNTER
Patient calling, she states she was switched to effexor about 2 weeks ago at OV on 75mg daily. She states that was not helpful so she increased to 150mg daily and things are improving. She is wondering if she can move forward with this dose? If so, would need a refill as she will be out soon. Med pended with changes and pharmaycy pended, please review and advise.     9/18/18 OV note:    1. Major depressive disorder, single episode, moderate (H)     Symptoms are not well controlled.  I do not believe that changing the  would cause this much worsening of symptoms all of a sudden.  I did however ordered nortriptyline to be ordered from manufacturing company that she was using in the past and it worked for her better.     Recommending to switch from Lexapro 20 mg to Effexor XR 75 mg daily.  Side effect profile reviewed.  Recommended counseling with the patient declined.  Instructed to call us back if any side effects noted or any other concerns noted.  Follow-up in 4 weeks for recheck.        - nortriptyline (PAMELOR) 50 MG capsule; TAKE TWO CAPSULES BY MOUTH DAILY AT BEDTIME  Dispense: 180 capsule; Refill: 1  - venlafaxine (EFFEXOR-XR) 75 MG 24 hr capsule; Take 1 capsule (75 mg) by mouth daily  Dispense: 30 capsule; Refill: 1    Triage to call with response [phone] okay to leave detailed message.     Brynn Gonzales RN   [facility] - Triage

## 2018-10-05 RX ORDER — VENLAFAXINE HYDROCHLORIDE 150 MG/1
150 CAPSULE, EXTENDED RELEASE ORAL DAILY
Qty: 30 CAPSULE | Refills: 1 | Status: SHIPPED | OUTPATIENT
Start: 2018-10-05 | End: 2018-10-30

## 2018-10-05 NOTE — TELEPHONE ENCOUNTER
Detailed message left with information noted below from provider and number to return call with any questions.  Radha MERCADO RN  [employer] Triage

## 2018-10-05 NOTE — TELEPHONE ENCOUNTER
Effexor  mg daily ordered.  Have the patient come in to be seen in 2-4 weeks for recheck at the office with me.    Lou Ricardo MD  [facility] [place]

## 2018-10-16 ENCOUNTER — OFFICE VISIT (OUTPATIENT)
Dept: FAMILY MEDICINE | Facility: CLINIC | Age: 53
End: 2018-10-16
Payer: COMMERCIAL

## 2018-10-16 VITALS
HEART RATE: 87 BPM | DIASTOLIC BLOOD PRESSURE: 70 MMHG | OXYGEN SATURATION: 100 % | TEMPERATURE: 98.6 F | BODY MASS INDEX: 30.49 KG/M2 | HEIGHT: 65 IN | WEIGHT: 183 LBS | SYSTOLIC BLOOD PRESSURE: 132 MMHG

## 2018-10-16 DIAGNOSIS — F41.9 ANXIETY: Primary | ICD-10-CM

## 2018-10-16 DIAGNOSIS — F32.1 MAJOR DEPRESSIVE DISORDER, SINGLE EPISODE, MODERATE (H): ICD-10-CM

## 2018-10-16 PROCEDURE — 99214 OFFICE O/P EST MOD 30 MIN: CPT | Performed by: FAMILY MEDICINE

## 2018-10-16 RX ORDER — PROPRANOLOL HCL 60 MG
60 CAPSULE, EXTENDED RELEASE 24HR ORAL DAILY
Qty: 30 CAPSULE | Refills: 1 | Status: SHIPPED | OUTPATIENT
Start: 2018-10-16 | End: 2018-11-13

## 2018-10-16 ASSESSMENT — ANXIETY QUESTIONNAIRES
2. NOT BEING ABLE TO STOP OR CONTROL WORRYING: NEARLY EVERY DAY
3. WORRYING TOO MUCH ABOUT DIFFERENT THINGS: NEARLY EVERY DAY
6. BECOMING EASILY ANNOYED OR IRRITABLE: NEARLY EVERY DAY
5. BEING SO RESTLESS THAT IT IS HARD TO SIT STILL: NEARLY EVERY DAY
GAD7 TOTAL SCORE: 21
1. FEELING NERVOUS, ANXIOUS, OR ON EDGE: NEARLY EVERY DAY
IF YOU CHECKED OFF ANY PROBLEMS ON THIS QUESTIONNAIRE, HOW DIFFICULT HAVE THESE PROBLEMS MADE IT FOR YOU TO DO YOUR WORK, TAKE CARE OF THINGS AT HOME, OR GET ALONG WITH OTHER PEOPLE: EXTREMELY DIFFICULT
7. FEELING AFRAID AS IF SOMETHING AWFUL MIGHT HAPPEN: NEARLY EVERY DAY

## 2018-10-16 ASSESSMENT — PATIENT HEALTH QUESTIONNAIRE - PHQ9: 5. POOR APPETITE OR OVEREATING: NEARLY EVERY DAY

## 2018-10-16 NOTE — PROGRESS NOTES
SUBJECTIVE:   Suni Mckeon is a 53 year old female who presents to clinic today for the following health issues:      Depression Followup    Status since last visit: things did seem better for a while but back not feeling well at all    See PHQ-9 for current symptoms.  Other associated symptoms: None    Complicating factors:   Significant life event:  No   Current substance abuse:  None  Anxiety or Panic symptoms:  Yes-      PHQ 5/1/2018 9/18/2018 10/16/2018   PHQ-9 Total Score 0 24 24   Q9: Suicide Ideation Not at all Not at all Not at all     BRUNILDA-7 SCORE 5/1/2018 9/18/2018 10/16/2018   Total Score - - -   Total Score - - -   Total Score 0 21 21     Patient is on Effexor  mg daily.  Initially she felt it was helping.  She also takes nortriptyline to help her sleep at night.  She gets decent sleep at night.  She tells that she always worries about her work.  Can stop thinking about it.  Worries if she makes a mistake what will happen.  She works in the [employer] department.  She has never had any problems with  making any mistakes at work.  She denies any trouble with concentration.    Tells that she has racing heart a lot of time.  Feels that she always has to give a presentation or something.    PHQ-9  English  PHQ-9   Any Language  Suicide Assessment Five-step Evaluation and Treatment (SAFE-T)    Amount of exercise or physical activity: None    Problems taking medications regularly: No    Medication side effects: none    Diet: regular (no restrictions)            Problem list and histories reviewed & adjusted, as indicated.  Additional history: as documented    Patient Active Problem List   Diagnosis     CARDIOVASCULAR SCREENING; LDL GOAL LESS THAN 160     Anxiety     Major depressive disorder, single episode, moderate (H)     Acquired hypothyroidism     Overweight     Left knee pain     Benign neoplasm of skin of trunk, except scrotum     Past Surgical History:   Procedure Laterality Date     COLONOSCOPY  "N/A 1/27/2017    NL, repeat 10 years     COLONOSCOPY N/A 1/30/2017    Procedure: COLONOSCOPY;  Surgeon: Dahiana Osuna MD;  Location:  GI     SURGICAL HISTORY OF -   2005    Exc. benign lesion left lower eyelid       Social History   Substance Use Topics     Smoking status: Never Smoker     Smokeless tobacco: Never Used     Alcohol use 0.0 - 1.2 oz/week     0 - 2 Standard drinks or equivalent per week      Comment: 2-3 drinks per mth     Family History   Problem Relation Age of Onset     Lipids Father      Psychotic Disorder Maternal Grandmother      depression     Diabetes Paternal Grandmother          Current Outpatient Prescriptions   Medication Sig Dispense Refill     levothyroxine (SYNTHROID/LEVOTHROID) 50 MCG tablet TAKE 1 TABLET BY MOUTH DAILY 90 tablet 3     nortriptyline (PAMELOR) 50 MG capsule TAKE TWO CAPSULES BY MOUTH DAILY AT BEDTIME 180 capsule 1     propranolol (INDERAL LA) 60 MG 24 hr capsule Take 1 capsule (60 mg) by mouth daily 30 capsule 1     venlafaxine (EFFEXOR-XR) 150 MG 24 hr capsule Take 1 capsule (150 mg) by mouth daily 30 capsule 1     Allergies   Allergen Reactions     Bupropion Itching     Tobramycin Visual Disturbance     used for pink eye and eyes became more irritated, burning and itchy       Reviewed and updated as needed this visit by clinical staff  Tobacco  Allergies  Meds  Med Hx  Surg Hx  Fam Hx  Soc Hx      Reviewed and updated as needed this visit by Provider         ROS:  CONSTITUTIONAL: NEGATIVE for fever, chills, change in weight  ENT/MOUTH: NEGATIVE for ear, mouth and throat problems  RESP: NEGATIVE for significant cough or SOB  CV: NEGATIVE for chest pain, palpitations or peripheral edema    OBJECTIVE:                                                    /70  Pulse 87  Temp 98.6  F (37  C) (Tympanic)  Ht 5' 4.5\" (1.638 m)  Wt 183 lb (83 kg)  LMP 12/13/2016 (LMP Unknown)  SpO2 100%  BMI 30.93 kg/m2  Body mass index is 30.93 kg/(m^2).   GENERAL: " healthy, alert, well nourished, well hydrated, no distress  RESP: lungs clear to auscultation - no rales, no rhonchi, no wheezes  CV: regular rates and rhythm, normal S1 S2, no S3 or S4 and no murmur, no click or rub -  PSYCH: Appears anxious.  Teary.         ASSESSMENT/PLAN:                                                        ICD-10-CM    1. Anxiety F41.9 propranolol (INDERAL LA) 60 MG 24 hr capsule   2. Major depressive disorder, single episode, moderate (H) F32.1      Symptoms are not well controlled.  Recommended to resume Effexor  mg daily and nortriptyline as before.  Adding on propanolol LA 60 mg daily to the regimen.  Side effect profile reviewed.  Follow-up in 2 weeks to see if that is helping her with anxiety or not.  Recommended therapy but patient declined.      Lou Ricardo MD  [facility] [place]

## 2018-10-16 NOTE — MR AVS SNAPSHOT
After Visit Summary   10/16/2018    Suni Mckeon    MRN: [id number]           Patient Information     Date Of Birth          1965        Visit Information        Provider Department      10/16/2018 6:20 PM Lou R[facility]ardo MD [facility]        Today's Diagnoses     Anxiety    -  1    Major depressive disorder, single episode, moderate (H)           Follow-ups after your visit        Follow-up notes from your care team     Return in about 2 weeks (around 10/30/2018) for Mood Recheck.      Your next 10 appointments already scheduled     Oct 30, 2018  5:20 PM CDT   [facility] Ricardo [facility]   [facility] [place]    [address] [address] [phone]           Bring a current list of meds and any records pertaining to this visit. For Physicals, please bring immunization records and any forms needing to be filled out. Please arrive 10 minutes early to complete paperwork.              Who to contact     If you have questions or need follow up information about today's clinic visit or your schedule please contact [facility] directly at [phone].  Normal or non-critical lab and imaging results will be communicated to you by MyChart, letter or phone within 4 business days after the clinic has received the results. If you do not hear from us within 7 days, please contact the clinic through MyChart or phone. If you have a critical or abnormal lab result, we will notify you by phone as soon as possible.  Submit refill requests through [employer] or call your pharmacy and they will forward the refill request to us. Please allow 3 business days for your refill to be completed.          Additional Information About Your Visit        MyChart Information     [employer] gives you secure access to your electronic health record. If you see a primary care provider, you can also send messages to your  "care team and make appointments. If you have questions, please call your primary care clinic.  If you do not have a primary care provider, please call [phone] and they will assist you.        Care EveryWhere ID     This is your Care EveryWhere ID. This could be used by other organizations to access your [place] medical records  [id number]        Your Vitals Were     Pulse Temperature Height Last Period Pulse Oximetry BMI (Body Mass Index)    87 98.6  F (37  C) (Tympanic) 5' 4.5\" (1.638 m) 12/13/2016 (LMP Unknown) 100% 30.93 kg/m2       Blood Pressure from Last 3 Encounters:   10/16/18 132/70   09/18/18 132/82   05/01/18 128/87    Weight from Last 3 Encounters:   10/16/18 183 lb (83 kg)   09/18/18 182 lb (82.6 kg)   05/01/18 182 lb 12.8 oz (82.9 kg)              Today, you had the following     No orders found for display         Today's Medication Changes          These changes are accurate as of 10/16/18  6:39 PM.  If you have any questions, ask your nurse or doctor.               Start taking these medicines.        Dose/Directions    propranolol 60 MG 24 hr capsule   Commonly known as:  INDERAL LA   Used for:  Anxiety   Started by:  Lou Ricardo MD        Dose:  60 mg   Take 1 capsule (60 mg) by mouth daily   Quantity:  30 capsule   Refills:  1            Where to get your medicines      These medications were sent to [place] [address] [place] [facility] [place] - [address]  [address] [facility] [address]     Phone:  [phone]     propranolol 60 MG 24 hr capsule                Primary Care Provider Office Phone # Fax #    Edis Yoon PA-C [phone]       [address]  [place] [address]        Equal Access to Services     [place] DUNN : Mandeep harp Sogustabo, waaxda luqadaha, qaybta kaalmada yevgeniyyabishop, karla san. [facility] [phone].    ATENCIÓN: Si regi lisha, tiene a may disposición servicios " katja de asistencia lingüística. Eduardo greenberg [phone].    We comply with applicable federal civil rights laws and [place] laws. We do not discriminate on the basis of race, color, national origin, age, disability, sex, sexual orientation, or gender identity.            Thank you!     Thank you for choosing [facility] [place]  for your care. Our goal is always to provide you with excellent care. Hearing back from our patients is one way we can continue to improve our services. Please take a few minutes to complete the written survey that you may receive in the mail after your visit with us. Thank you!             Your Updated Medication List - Protect others around you: Learn how to safely use, store and throw away your medicines at www.disposemymeds.org.          This list is accurate as of 10/16/18  6:39 PM.  Always use your most recent med list.                   Brand Name Dispense Instructions for use Diagnosis    levothyroxine 50 MCG tablet    SYNTHROID/LEVOTHROID    90 tablet    TAKE 1 TABLET BY MOUTH DAILY    Acquired hypothyroidism       nortriptyline 50 MG capsule    PAMELOR    180 capsule    TAKE TWO CAPSULES BY MOUTH DAILY AT BEDTIME    Major depressive disorder, single episode, moderate (H)       propranolol 60 MG 24 hr capsule    INDERAL LA    30 capsule    Take 1 capsule (60 mg) by mouth daily    Anxiety       venlafaxine 150 MG 24 hr capsule    EFFEXOR-XR    30 capsule    Take 1 capsule (150 mg) by mouth daily    Major depressive disorder, single episode, moderate (H)

## 2018-10-17 ASSESSMENT — PATIENT HEALTH QUESTIONNAIRE - PHQ9: SUM OF ALL RESPONSES TO PHQ QUESTIONS 1-9: 24

## 2018-10-17 ASSESSMENT — ANXIETY QUESTIONNAIRES: GAD7 TOTAL SCORE: 21

## 2018-10-30 ENCOUNTER — OFFICE VISIT (OUTPATIENT)
Dept: FAMILY MEDICINE | Facility: CLINIC | Age: 53
End: 2018-10-30
Payer: COMMERCIAL

## 2018-10-30 VITALS
DIASTOLIC BLOOD PRESSURE: 70 MMHG | BODY MASS INDEX: 30.49 KG/M2 | WEIGHT: 183 LBS | TEMPERATURE: 98.3 F | HEIGHT: 65 IN | HEART RATE: 70 BPM | SYSTOLIC BLOOD PRESSURE: 112 MMHG

## 2018-10-30 DIAGNOSIS — F41.9 ANXIETY: ICD-10-CM

## 2018-10-30 DIAGNOSIS — F32.1 MAJOR DEPRESSIVE DISORDER, SINGLE EPISODE, MODERATE (H): ICD-10-CM

## 2018-10-30 PROCEDURE — 99214 OFFICE O/P EST MOD 30 MIN: CPT | Performed by: FAMILY MEDICINE

## 2018-10-30 RX ORDER — VENLAFAXINE HYDROCHLORIDE 75 MG/1
225 CAPSULE, EXTENDED RELEASE ORAL DAILY
Qty: 90 CAPSULE | Refills: 3 | Status: SHIPPED | OUTPATIENT
Start: 2018-10-30 | End: 2019-02-05

## 2018-10-30 ASSESSMENT — PATIENT HEALTH QUESTIONNAIRE - PHQ9
5. POOR APPETITE OR OVEREATING: SEVERAL DAYS
SUM OF ALL RESPONSES TO PHQ QUESTIONS 1-9: 16

## 2018-10-30 ASSESSMENT — ANXIETY QUESTIONNAIRES
6. BECOMING EASILY ANNOYED OR IRRITABLE: SEVERAL DAYS
IF YOU CHECKED OFF ANY PROBLEMS ON THIS QUESTIONNAIRE, HOW DIFFICULT HAVE THESE PROBLEMS MADE IT FOR YOU TO DO YOUR WORK, TAKE CARE OF THINGS AT HOME, OR GET ALONG WITH OTHER PEOPLE: VERY DIFFICULT
2. NOT BEING ABLE TO STOP OR CONTROL WORRYING: SEVERAL DAYS
7. FEELING AFRAID AS IF SOMETHING AWFUL MIGHT HAPPEN: SEVERAL DAYS
5. BEING SO RESTLESS THAT IT IS HARD TO SIT STILL: SEVERAL DAYS
3. WORRYING TOO MUCH ABOUT DIFFERENT THINGS: SEVERAL DAYS
GAD7 TOTAL SCORE: 7
1. FEELING NERVOUS, ANXIOUS, OR ON EDGE: SEVERAL DAYS

## 2018-10-30 NOTE — PROGRESS NOTES
SUBJECTIVE:   Suni Mckeon is a 53 year old female who presents to clinic today for the following health issues:      Depression and Anxiety Follow-Up    Status since last visit: but states she is unsure if she is better worse or the same.    Other associated symptoms:None    Complicating factors:     Significant life event: No     Current substance abuse: None    PHQ 9/18/2018 10/16/2018 10/30/2018   PHQ-9 Total Score 24 24 16   Q9: Suicide Ideation Not at all Not at all Not at all     BRUNILDA-7 SCORE 9/18/2018 10/16/2018 10/30/2018   Total Score - - -   Total Score - - -   Total Score 21 21 7       PHQ-9  English  PHQ-9   Any Language  BRUNILDA-7  Suicide Assessment Five-step Evaluation and Treatment (SAFE-T)    Amount of exercise or physical activity:     Problems taking medications regularly: No    Medication side effects: none    Diet: regular (no restrictions)    Patient reports that with using propanolol she feels much calmer at work.  Does not get often the panic attacks that she used to get previously.  Reports of ongoing anxiety symptoms.        Problem list and histories reviewed & adjusted, as indicated.  Additional history: as documented    Patient Active Problem List   Diagnosis     CARDIOVASCULAR SCREENING; LDL GOAL LESS THAN 160     Anxiety     Major depressive disorder, single episode, moderate (H)     Acquired hypothyroidism     Overweight     Left knee pain     Benign neoplasm of skin of trunk, except scrotum     Past Surgical History:   Procedure Laterality Date     COLONOSCOPY N/A 1/27/2017    NL, repeat 10 years     COLONOSCOPY N/A 1/30/2017    Procedure: COLONOSCOPY;  Surgeon: Dahiana Osuna MD;  Location:  GI     SURGICAL HISTORY OF -   2005    Exc. benign lesion left lower eyelid       Social History   Substance Use Topics     Smoking status: Never Smoker     Smokeless tobacco: Never Used     Alcohol use 0.0 - 1.2 oz/week     0 - 2 Standard drinks or equivalent per week      Comment:  "2-3 drinks per mth     Family History   Problem Relation Age of Onset     Lipids Father      Psychotic Disorder Maternal Grandmother      depression     Diabetes Paternal Grandmother          Current Outpatient Prescriptions   Medication Sig Dispense Refill     levothyroxine (SYNTHROID/LEVOTHROID) 50 MCG tablet TAKE 1 TABLET BY MOUTH DAILY 90 tablet 3     nortriptyline (PAMELOR) 50 MG capsule TAKE TWO CAPSULES BY MOUTH DAILY AT BEDTIME 180 capsule 1     propranolol (INDERAL LA) 60 MG 24 hr capsule Take 1 capsule (60 mg) by mouth daily 30 capsule 1     venlafaxine (EFFEXOR-XR) 75 MG 24 hr capsule Take 3 capsules (225 mg) by mouth daily 90 capsule 3     [DISCONTINUED] venlafaxine (EFFEXOR-XR) 150 MG 24 hr capsule Take 1 capsule (150 mg) by mouth daily 30 capsule 1     Allergies   Allergen Reactions     Bupropion Itching     Tobramycin Visual Disturbance     used for pink eye and eyes became more irritated, burning and itchy       Reviewed and updated as needed this visit by clinical staff  Tobacco  Allergies  Meds  Problems       Reviewed and updated as needed this visit by Provider  Allergies  Meds  Problems         ROS:  CONSTITUTIONAL: NEGATIVE for fever, chills, change in weight  ENT/MOUTH: NEGATIVE for ear, mouth and throat problems  RESP: NEGATIVE for significant cough or SOB  CV: NEGATIVE for chest pain, palpitations or peripheral edema    OBJECTIVE:                                                    /70  Pulse 70  Temp 98.3  F (36.8  C) (Tympanic)  Ht 5' 4.5\" (1.638 m)  Wt 183 lb (83 kg)  LMP 12/13/2016 (LMP Unknown)  BMI 30.93 kg/m2  Body mass index is 30.93 kg/(m^2).   GENERAL: healthy, alert, well nourished, well hydrated, no distress  HENT: ear canals- normal; TMs- normal; Nose- normal; Mouth- no ulcers, no lesions  NECK: no tenderness, no adenopathy, no asymmetry, no masses, no stiffness; thyroid- normal to palpation  RESP: lungs clear to auscultation - no rales, no rhonchi, no " wheezes  CV: regular rates and rhythm, normal S1 S2, no S3 or S4 and no murmur, no click or rub -  ABDOMEN: soft, no tenderness, no  hepatosplenomegaly, no masses, normal bowel sounds  PSYCH: Alert and oriented times 3; speech- coherent , normal rate and volume; able to articulate logical thoughts, able to abstract reason, no tangential thoughts, no hallucinations or delusions, affect- normal         ASSESSMENT/PLAN:                                                        ICD-10-CM    1. Major depressive disorder, single episode, moderate (H) F32.1 venlafaxine (EFFEXOR-XR) 75 MG 24 hr capsule   2. Anxiety F41.9      Symptoms have improved as compared to before but not well controlled.  Recommending to further increase the dose of venlafaxine to 225 mg XR daily.  Continue propranolol LA 60 mg daily.  Continue the use of nortriptyline at night.  Follow-up in 2 weeks recommended.          Lou Ricardo MD  [facility]

## 2018-10-30 NOTE — MR AVS SNAPSHOT
After Visit Summary   10/30/2018    Suni Mckeon    MRN: [id number]           Patient Information     Date Of Birth          1965        Visit Information        Provider Department      10/30/2018 5:20 PM Lou Ricardo MD [facility]        Today's Diagnoses     Major depressive disorder, single episode, moderate (H)        Anxiety           Follow-ups after your visit        Follow-up notes from your care team     Return in about 2 weeks (around 11/13/2018) for Mood Recheck.      Your next 10 appointments already scheduled     Nov 13, 2018  6:00 PM CST   Office Visit with Lou Ricardo MD   [facility] [place]    [address] [place] [id number] [phone]           Bring a current list of meds and any records pertaining to this visit. For Physicals, please bring immunization records and any forms needing to be filled out. Please arrive 10 minutes early to complete paperwork.              Who to contact     If you have questions or need follow up information about today's clinic visit or your schedule please contact [facility] directly at [phone].  Normal or non-critical lab and imaging results will be communicated to you by MyChart, letter or phone within 4 business days after the clinic has received the results. If you do not hear from us within 7 days, please contact the clinic through [employer]Room[employer] or phone. If you have a critical or abnormal lab result, we will notify you by phone as soon as possible.  Submit refill requests through [employer] or call your pharmacy and they will forward the refill request to us. Please allow 3 business days for your refill to be completed.          Additional Information About Your Visit        MyChart Information     [employer] gives you secure access to your electronic health record. If you see a primary care provider, you can also send messages to your care  "team and make appointments. If you have questions, please call your primary care clinic.  If you do not have a primary care provider, please call [phone] and they will assist you.        Care EveryWhere ID     This is your Care EveryWhere ID. This could be used by other organizations to access your [place] medical records  [id number]        Your Vitals Were     Pulse Temperature Height Last Period BMI (Body Mass Index)       70 98.3  F (36.8  C) (Tympanic) 5' 4.5\" (1.638 m) 12/13/2016 (LMP Unknown) 30.93 kg/m2        Blood Pressure from Last 3 Encounters:   10/30/18 112/70   10/16/18 132/70   09/18/18 132/82    Weight from Last 3 Encounters:   10/30/18 183 lb (83 kg)   10/16/18 183 lb (83 kg)   09/18/18 182 lb (82.6 kg)              Today, you had the following     No orders found for display         Today's Medication Changes          These changes are accurate as of 10/30/18  5:35 PM.  If you have any questions, ask your nurse or doctor.               These medicines have changed or have updated prescriptions.        Dose/Directions    venlafaxine 75 MG 24 hr capsule   Commonly known as:  EFFEXOR-XR   This may have changed:    - medication strength  - how much to take   Used for:  Major depressive disorder, single episode, moderate (H)   Changed by:  Lou Ricardo MD        Dose:  225 mg   Take 3 capsules (225 mg) by mouth daily   Quantity:  90 capsule   Refills:  3            Where to get your medicines      These medications were sent to [facility] [id number] [facility] [place] - [address]  [address], [place] [address]     Phone:  [phone]     venlafaxine 75 MG 24 hr capsule                Primary Care Provider Office Phone # Fax #    Edis Yoon PA-C [phone]       [address]  [place] [address]        Equal Access to Services     [place] AH: Hadii beau ku hadasho Soomaali, waaxda luqadaha, qaybta kaalmada nick, karla " jennifer evans'aan ah. So [facility] [phone].    ATENCIÓN: Si regi husain, tiene a may disposición servicios gratuitos de asistencia lingüística. Eduardo al [phone].    We comply with applicable federal civil rights laws and [place] laws. We do not discriminate on the basis of race, color, national origin, age, disability, sex, sexual orientation, or gender identity.            Thank you!     Thank you for choosing [facility] [place]  for your care. Our goal is always to provide you with excellent care. Hearing back from our patients is one way we can continue to improve our services. Please take a few minutes to complete the written survey that you may receive in the mail after your visit with us. Thank you!             Your Updated Medication List - Protect others around you: Learn how to safely use, store and throw away your medicines at www.disposemymeds.org.          This list is accurate as of 10/30/18  5:35 PM.  Always use your most recent med list.                   Brand Name Dispense Instructions for use Diagnosis    levothyroxine 50 MCG tablet    SYNTHROID/LEVOTHROID    90 tablet    TAKE 1 TABLET BY MOUTH DAILY    Acquired hypothyroidism       nortriptyline 50 MG capsule    PAMELOR    180 capsule    TAKE TWO CAPSULES BY MOUTH DAILY AT BEDTIME    Major depressive disorder, single episode, moderate (H)       propranolol 60 MG 24 hr capsule    INDERAL LA    30 capsule    Take 1 capsule (60 mg) by mouth daily    Anxiety       venlafaxine 75 MG 24 hr capsule    EFFEXOR-XR    90 capsule    Take 3 capsules (225 mg) by mouth daily    Major depressive disorder, single episode, moderate (H)

## 2018-10-31 ASSESSMENT — ANXIETY QUESTIONNAIRES: GAD7 TOTAL SCORE: 7

## 2018-11-13 ENCOUNTER — OFFICE VISIT (OUTPATIENT)
Dept: FAMILY MEDICINE | Facility: CLINIC | Age: 53
End: 2018-11-13
Payer: COMMERCIAL

## 2018-11-13 VITALS
WEIGHT: 183 LBS | TEMPERATURE: 97.9 F | DIASTOLIC BLOOD PRESSURE: 70 MMHG | BODY MASS INDEX: 30.49 KG/M2 | OXYGEN SATURATION: 100 % | HEART RATE: 71 BPM | HEIGHT: 65 IN | SYSTOLIC BLOOD PRESSURE: 132 MMHG

## 2018-11-13 DIAGNOSIS — F32.1 MAJOR DEPRESSIVE DISORDER, SINGLE EPISODE, MODERATE (H): Primary | ICD-10-CM

## 2018-11-13 DIAGNOSIS — F41.9 ANXIETY: ICD-10-CM

## 2018-11-13 PROCEDURE — 99213 OFFICE O/P EST LOW 20 MIN: CPT | Performed by: FAMILY MEDICINE

## 2018-11-13 RX ORDER — PROPRANOLOL HCL 60 MG
60 CAPSULE, EXTENDED RELEASE 24HR ORAL DAILY
Qty: 90 CAPSULE | Refills: 1 | Status: SHIPPED | OUTPATIENT
Start: 2018-11-13 | End: 2019-02-05

## 2018-11-13 ASSESSMENT — ANXIETY QUESTIONNAIRES
3. WORRYING TOO MUCH ABOUT DIFFERENT THINGS: NOT AT ALL
6. BECOMING EASILY ANNOYED OR IRRITABLE: NOT AT ALL
5. BEING SO RESTLESS THAT IT IS HARD TO SIT STILL: NOT AT ALL
7. FEELING AFRAID AS IF SOMETHING AWFUL MIGHT HAPPEN: NOT AT ALL
1. FEELING NERVOUS, ANXIOUS, OR ON EDGE: NOT AT ALL
GAD7 TOTAL SCORE: 0
IF YOU CHECKED OFF ANY PROBLEMS ON THIS QUESTIONNAIRE, HOW DIFFICULT HAVE THESE PROBLEMS MADE IT FOR YOU TO DO YOUR WORK, TAKE CARE OF THINGS AT HOME, OR GET ALONG WITH OTHER PEOPLE: NOT DIFFICULT AT ALL
2. NOT BEING ABLE TO STOP OR CONTROL WORRYING: NOT AT ALL

## 2018-11-13 ASSESSMENT — PATIENT HEALTH QUESTIONNAIRE - PHQ9
5. POOR APPETITE OR OVEREATING: NOT AT ALL
SUM OF ALL RESPONSES TO PHQ QUESTIONS 1-9: 1

## 2018-11-13 NOTE — MR AVS SNAPSHOT
After Visit Summary   11/13/2018    Suni Mckeon    MRN: [id number]           Patient Information     Date Of Birth          1965        Visit Information        Provider Department      11/13/2018 6:00 PM [facility] MD [facility]        Today's Diagnoses     Anxiety           Follow-ups after your visit        Follow-up notes from your care team     Return in about 3 months (around 2/12/2019) for Mood Recheck.      Your next 10 appointments already scheduled     Feb 05, 2019  6:00 PM CST   Office Visit with Lou Ricardo MD   [facility] [place] ([facility])    [address] [place] [id number] [phone]           Bring a current list of meds and any records pertaining to this visit. For Physicals, please bring immunization records and any forms needing to be filled out. Please arrive 10 minutes early to complete paperwork.              Who to contact     If you have questions or need follow up information about today's clinic visit or your schedule please contact [facility] directly at [phone].  Normal or non-critical lab and imaging results will be communicated to you by MyChart, letter or phone within 4 business days after the clinic has received the results. If you do not hear from us within 7 days, please contact the clinic through St[employer]art or phone. If you have a critical or abnormal lab result, we will notify you by phone as soon as possible.  Submit refill requests through [employer] or call your pharmacy and they will forward the refill request to us. Please allow 3 business days for your refill to be completed.          Additional Information About Your Visit        MyChart Information     [employer] gives you secure access to your electronic health record. If you see a primary care provider, you can also send messages to your care team and make appointments. If you have questions, please call  "your primary care clinic.  If you do not have a primary care provider, please call [phone] and they will assist you.        Care EveryWhere ID     This is your Care EveryWhere ID. This could be used by other organizations to access your [place] medical records  [id number]        Your Vitals Were     Pulse Temperature Height Last Period Pulse Oximetry BMI (Body Mass Index)    71 97.9  F (36.6  C) (Tympanic) 5' 4.5\" (1.638 m) 12/13/2016 (LMP Unknown) 100% 30.93 kg/m2       Blood Pressure from Last 3 Encounters:   11/13/18 132/70   10/30/18 112/70   10/16/18 132/70    Weight from Last 3 Encounters:   11/13/18 183 lb (83 kg)   10/30/18 183 lb (83 kg)   10/16/18 183 lb (83 kg)              Today, you had the following     No orders found for display         Where to get your medicines      These medications were sent to [place] [address] [facility] [place] - [address]  [address] [place] [address]     Phone:  [phone]     propranolol 60 MG 24 hr capsule          Primary Care Provider Office Phone # Fax #    Edis Yoon PA-C [phone]       [address]  [place] [address]        Equal Access to Services     [facility]: Hadii aad ku hadasho Soomaali, waaxda luqadaha, qaybta kaalmada adeegyada, karla jaeger . So [facility] [phone].    ATENCIÓN: Si habla español, tiene a may disposición servicios gratuitos de asistencia lingüística. Llame al [phone].    We comply with applicable federal civil rights laws and [place] laws. We do not discriminate on the basis of race, color, national origin, age, disability, sex, sexual orientation, or gender identity.            Thank you!     Thank you for choosing [facility] [place]  for your care. Our goal is always to provide you with excellent care. Hearing back from our patients is one way we can continue to improve our services. Please take a few minutes " to complete the written survey that you may receive in the mail after your visit with us. Thank you!             Your Updated Medication List - Protect others around you: Learn how to safely use, store and throw away your medicines at www.disposemymeds.org.          This list is accurate as of 11/13/18  6:22 PM.  Always use your most recent med list.                   Brand Name Dispense Instructions for use Diagnosis    levothyroxine 50 MCG tablet    SYNTHROID/LEVOTHROID    90 tablet    TAKE 1 TABLET BY MOUTH DAILY    Acquired hypothyroidism       nortriptyline 50 MG capsule    PAMELOR    180 capsule    TAKE TWO CAPSULES BY MOUTH DAILY AT BEDTIME    Major depressive disorder, single episode, moderate (H)       propranolol 60 MG 24 hr capsule    INDERAL LA    90 capsule    Take 1 capsule (60 mg) by mouth daily    Anxiety       venlafaxine 75 MG 24 hr capsule    EFFEXOR-XR    90 capsule    Take 3 capsules (225 mg) by mouth daily    Major depressive disorder, single episode, moderate (H)

## 2018-11-14 ASSESSMENT — ANXIETY QUESTIONNAIRES: GAD7 TOTAL SCORE: 0

## 2018-11-14 NOTE — PROGRESS NOTES
SUBJECTIVE:   Suni Mckeon is a 53 year old female who presents to clinic today for the following health issues:      Depression and anxiety Followup    Status since last visit: Improved a lot    See PHQ-9 for current symptoms.  Other associated symptoms: None    Complicating factors:   Significant life event:  No   Current substance abuse:  None      PHQ 10/16/2018 10/30/2018 11/13/2018   PHQ-9 Total Score 24 16 1   Q9: Suicide Ideation Not at all Not at all Not at all     BRUNILDA-7 SCORE 10/16/2018 10/30/2018 11/13/2018   Total Score - - -   Total Score - - -   Total Score 21 7 0         PHQ-9  English  PHQ-9   Any Language  Suicide Assessment Five-step Evaluation and Treatment (SAFE-T)    Amount of exercise or physical activity: None    Problems taking medications regularly: No    Medication side effects: none    Diet: regular (no restrictions)            Problem list and histories reviewed & adjusted, as indicated.  Additional history: as documented    Patient Active Problem List   Diagnosis     CARDIOVASCULAR SCREENING; LDL GOAL LESS THAN 160     Anxiety     Major depressive disorder, single episode, moderate (H)     Acquired hypothyroidism     Overweight     Left knee pain     Benign neoplasm of skin of trunk, except scrotum     Past Surgical History:   Procedure Laterality Date     COLONOSCOPY N/A 1/27/2017    NL, repeat 10 years     COLONOSCOPY N/A 1/30/2017    Procedure: COLONOSCOPY;  Surgeon: Dahiana Osuna MD;  Location:  GI     SURGICAL HISTORY OF -   2005    Exc. benign lesion left lower eyelid       Social History   Substance Use Topics     Smoking status: Never Smoker     Smokeless tobacco: Never Used     Alcohol use 0.0 - 1.2 oz/week     0 - 2 Standard drinks or equivalent per week      Comment: 2-3 drinks per mth     Family History   Problem Relation Age of Onset     Lipids Father      Psychotic Disorder Maternal Grandmother      depression     Diabetes Paternal Grandmother       "    Current Outpatient Prescriptions   Medication Sig Dispense Refill     levothyroxine (SYNTHROID/LEVOTHROID) 50 MCG tablet TAKE 1 TABLET BY MOUTH DAILY 90 tablet 3     nortriptyline (PAMELOR) 50 MG capsule TAKE TWO CAPSULES BY MOUTH DAILY AT BEDTIME 180 capsule 1     propranolol (INDERAL LA) 60 MG 24 hr capsule Take 1 capsule (60 mg) by mouth daily 90 capsule 1     venlafaxine (EFFEXOR-XR) 75 MG 24 hr capsule Take 3 capsules (225 mg) by mouth daily 90 capsule 3     [DISCONTINUED] propranolol (INDERAL LA) 60 MG 24 hr capsule Take 1 capsule (60 mg) by mouth daily 30 capsule 1     Allergies   Allergen Reactions     Bupropion Itching     Tobramycin Visual Disturbance     used for pink eye and eyes became more irritated, burning and itchy       Reviewed and updated as needed this visit by clinical staff  Tobacco  Allergies  Meds  Problems  Med Hx  Surg Hx  Fam Hx  Soc Hx        Reviewed and updated as needed this visit by Provider  Allergies  Meds  Problems         ROS:  CONSTITUTIONAL: NEGATIVE for fever, chills, change in weight  ENT/MOUTH: NEGATIVE for ear, mouth and throat problems  RESP: NEGATIVE for significant cough or SOB  CV: NEGATIVE for chest pain, palpitations or peripheral edema    OBJECTIVE:                                                    /70  Pulse 71  Temp 97.9  F (36.6  C) (Tympanic)  Ht 5' 4.5\" (1.638 m)  Wt 183 lb (83 kg)  LMP 12/13/2016 (LMP Unknown)  SpO2 100%  BMI 30.93 kg/m2  Body mass index is 30.93 kg/(m^2).   GENERAL: healthy, alert, well nourished, well hydrated, no distress  NECK: no tenderness, no adenopathy, no asymmetry, no masses, no stiffness; thyroid- normal to palpation  RESP: lungs clear to auscultation - no rales, no rhonchi, no wheezes  CV: regular rates and rhythm, normal S1 S2, no S3 or S4 and no murmur, no click or rub -  PSYCH: Alert and oriented times 3; speech- coherent , normal rate and volume; able to articulate logical thoughts, able to abstract " reason, no tangential thoughts, no hallucinations or delusions, affect- normal         ASSESSMENT/PLAN:                                                        ICD-10-CM    1. Major depressive disorder, single episode, moderate (H) F32.1    2. Anxiety F41.9 propranolol (INDERAL LA) 60 MG 24 hr capsule     Symptoms are well controlled.  Current medication regimen is working very well.  Recommending to resume medications.  Follow-up in February 2019 for recheck.  In between if any concerns noted, instructed to notify me back.      Lou Ricardo MD  [facility]

## 2019-02-05 ENCOUNTER — OFFICE VISIT (OUTPATIENT)
Dept: FAMILY MEDICINE | Facility: CLINIC | Age: 54
End: 2019-02-05
Payer: COMMERCIAL

## 2019-02-05 VITALS
SYSTOLIC BLOOD PRESSURE: 120 MMHG | HEIGHT: 65 IN | DIASTOLIC BLOOD PRESSURE: 80 MMHG | OXYGEN SATURATION: 98 % | BODY MASS INDEX: 31.16 KG/M2 | WEIGHT: 187 LBS | HEART RATE: 80 BPM | TEMPERATURE: 97.7 F

## 2019-02-05 DIAGNOSIS — F41.9 ANXIETY: ICD-10-CM

## 2019-02-05 DIAGNOSIS — F32.1 MAJOR DEPRESSIVE DISORDER, SINGLE EPISODE, MODERATE (H): ICD-10-CM

## 2019-02-05 PROCEDURE — 99213 OFFICE O/P EST LOW 20 MIN: CPT | Performed by: FAMILY MEDICINE

## 2019-02-05 RX ORDER — NORTRIPTYLINE HYDROCHLORIDE 50 MG/1
CAPSULE ORAL
Qty: 180 CAPSULE | Refills: 1 | Status: SHIPPED | OUTPATIENT
Start: 2019-02-05 | End: 2019-05-07

## 2019-02-05 RX ORDER — VENLAFAXINE HYDROCHLORIDE 75 MG/1
225 CAPSULE, EXTENDED RELEASE ORAL DAILY
Qty: 270 CAPSULE | Refills: 1 | Status: SHIPPED | OUTPATIENT
Start: 2019-02-05 | End: 2019-05-07

## 2019-02-05 RX ORDER — PROPRANOLOL HCL 60 MG
60 CAPSULE, EXTENDED RELEASE 24HR ORAL DAILY
Qty: 90 CAPSULE | Refills: 1 | Status: SHIPPED | OUTPATIENT
Start: 2019-02-05 | End: 2019-05-07

## 2019-02-05 ASSESSMENT — MIFFLIN-ST. JEOR: SCORE: 1441.17

## 2019-02-05 NOTE — PROGRESS NOTES
SUBJECTIVE:   Suni Mckeon is a 53 year old female who presents to clinic today for the following health issues:      Depression and anxiety Followup    Status since last visit: Improved     See PHQ-9 for current symptoms.  Other associated symptoms: None    Complicating factors:   Significant life event:  No   Current substance abuse:  None      PHQ 10/16/2018 10/30/2018 11/13/2018   PHQ-9 Total Score 24 16 1   Q9: Suicide Ideation Not at all Not at all Not at all       PHQ-9  English  PHQ-9   Any Language  Suicide Assessment Five-step Evaluation and Treatment (SAFE-T)    Amount of exercise or physical activity: 2-3 days/week for an average of 45-60 minutes    Problems taking medications regularly: No    Medication side effects: none    Diet: regular (no restrictions)            Problem list and histories reviewed & adjusted, as indicated.  Additional history: as documented    Patient Active Problem List   Diagnosis     CARDIOVASCULAR SCREENING; LDL GOAL LESS THAN 160     Anxiety     Major depressive disorder, single episode, moderate (H)     Acquired hypothyroidism     Overweight     Left knee pain     Benign neoplasm of skin of trunk, except scrotum     Past Surgical History:   Procedure Laterality Date     COLONOSCOPY N/A 1/27/2017    NL, repeat 10 years     COLONOSCOPY N/A 1/30/2017    Procedure: COLONOSCOPY;  Surgeon: Dahiana Osuna MD;  Location:  GI     SURGICAL HISTORY OF -   2005    Exc. benign lesion left lower eyelid       Social History     Tobacco Use     Smoking status: Never Smoker     Smokeless tobacco: Never Used   Substance Use Topics     Alcohol use: Yes     Alcohol/week: 0.0 - 1.2 oz     Comment: 2-3 drinks per mth     Family History   Problem Relation Age of Onset     Lipids Father      Psychotic Disorder Maternal Grandmother         depression     Diabetes Paternal Grandmother          Current Outpatient Medications   Medication Sig Dispense Refill     levothyroxine  "(SYNTHROID/LEVOTHROID) 50 MCG tablet TAKE 1 TABLET BY MOUTH DAILY 90 tablet 3     nortriptyline (PAMELOR) 50 MG capsule TAKE TWO CAPSULES BY MOUTH DAILY AT BEDTIME 180 capsule 1     propranolol ER (INDERAL LA) 60 MG 24 hr capsule Take 1 capsule (60 mg) by mouth daily 90 capsule 1     venlafaxine (EFFEXOR-XR) 75 MG 24 hr capsule Take 3 capsules (225 mg) by mouth daily 270 capsule 1     Allergies   Allergen Reactions     Bupropion Itching     Tobramycin Visual Disturbance     used for pink eye and eyes became more irritated, burning and itchy       Reviewed and updated as needed this visit by clinical staff       Reviewed and updated as needed this visit by Provider         ROS:  CONSTITUTIONAL: NEGATIVE for fever, chills, change in weight  ENT/MOUTH: NEGATIVE for ear, mouth and throat problems  RESP: NEGATIVE for significant cough or SOB  CV: NEGATIVE for chest pain, palpitations or peripheral edema    OBJECTIVE:                                                    /80   Pulse 80   Temp 97.7  F (36.5  C) (Tympanic)   Ht 1.638 m (5' 4.5\")   Wt 84.8 kg (187 lb)   LMP 12/13/2016 (LMP Unknown)   SpO2 98%   BMI 31.60 kg/m    Body mass index is 31.6 kg/m .   GENERAL: healthy, alert, well nourished, well hydrated, no distress  NECK: no tenderness, no adenopathy, no asymmetry, no masses, no stiffness; thyroid- normal to palpation  RESP: lungs clear to auscultation - no rales, no rhonchi, no wheezes  CV: regular rates and rhythm, normal S1 S2, no S3 or S4 and no murmur, no click or rub -  PSYCH: Alert and oriented times 3; speech- coherent , normal rate and volume; able to articulate logical thoughts, able to abstract reason, no tangential thoughts, no hallucinations or delusions, affect- normal         ASSESSMENT/PLAN:                                                        ICD-10-CM    1. Major depressive disorder, single episode, moderate (H) F32.1 nortriptyline (PAMELOR) 50 MG capsule     venlafaxine " (EFFEXOR-XR) 75 MG 24 hr capsule   2. Anxiety F41.9 propranolol ER (INDERAL LA) 60 MG 24 hr capsule     Symptoms of anxiety and depression are well controlled.  Patient would like to resume current medications without any changes.   refills ordered.  Recommending to follow-up in the next few months for a recheck on thyroid functions and annual examination.  We would like to start weaning down on medications  in the next few months if it works out well.      Lou Ricardo MD  [facility]

## 2019-03-05 ENCOUNTER — TELEPHONE (OUTPATIENT)
Dept: FAMILY MEDICINE | Facility: CLINIC | Age: 54
End: 2019-03-05

## 2019-03-05 NOTE — TELEPHONE ENCOUNTER
Pt is due now to update PHQ9.  dathart message sent to pt. Follow up end date 5/18/19. Not done at last office visit.   PHQ-9 SCORE 10/16/2018 10/30/2018 11/13/2018   PHQ-9 Total Score - - -   PHQ-9 Total Score MyChart - - -   PHQ-9 Total Score 24 16 1     Tez FELIZ CMA

## 2019-03-12 NOTE — TELEPHONE ENCOUNTER
Mailbox is full unable to leave message.     Haylee RODRIGUEZN, RN   [facility]

## 2019-03-13 ASSESSMENT — PATIENT HEALTH QUESTIONNAIRE - PHQ9: SUM OF ALL RESPONSES TO PHQ QUESTIONS 1-9: 0

## 2019-03-13 NOTE — TELEPHONE ENCOUNTER
PHQ-9 SCORE 10/30/2018 11/13/2018 3/13/2019   PHQ-9 Total Score - - -   PHQ-9 Total Score MyChart - - -   PHQ-9 Total Score 16 1 0     PHQ updated.   Brynn Gonzales RN   [facility] - Triage

## 2019-05-07 ENCOUNTER — OFFICE VISIT (OUTPATIENT)
Dept: FAMILY MEDICINE | Facility: CLINIC | Age: 54
End: 2019-05-07
Payer: COMMERCIAL

## 2019-05-07 VITALS
HEART RATE: 79 BPM | WEIGHT: 189 LBS | SYSTOLIC BLOOD PRESSURE: 116 MMHG | DIASTOLIC BLOOD PRESSURE: 80 MMHG | HEIGHT: 65 IN | BODY MASS INDEX: 31.49 KG/M2

## 2019-05-07 DIAGNOSIS — Z11.4 ENCOUNTER FOR SCREENING FOR HIV: ICD-10-CM

## 2019-05-07 DIAGNOSIS — Z00.00 ANNUAL PHYSICAL EXAM: Primary | ICD-10-CM

## 2019-05-07 DIAGNOSIS — Z23 NEED FOR VACCINATION: ICD-10-CM

## 2019-05-07 DIAGNOSIS — F32.1 MAJOR DEPRESSIVE DISORDER, SINGLE EPISODE, MODERATE (H): ICD-10-CM

## 2019-05-07 DIAGNOSIS — E03.9 ACQUIRED HYPOTHYROIDISM: ICD-10-CM

## 2019-05-07 DIAGNOSIS — F41.9 ANXIETY: ICD-10-CM

## 2019-05-07 PROBLEM — E66.3 OVERWEIGHT: Status: RESOLVED | Noted: 2018-05-01 | Resolved: 2019-05-07

## 2019-05-07 PROBLEM — D23.5 BENIGN NEOPLASM OF SKIN OF TRUNK, EXCEPT SCROTUM: Status: RESOLVED | Noted: 2018-05-01 | Resolved: 2019-05-07

## 2019-05-07 PROBLEM — M25.562 LEFT KNEE PAIN: Status: RESOLVED | Noted: 2018-05-01 | Resolved: 2019-05-07

## 2019-05-07 PROCEDURE — 99396 PREV VISIT EST AGE 40-64: CPT | Mod: 25 | Performed by: FAMILY MEDICINE

## 2019-05-07 PROCEDURE — 87389 HIV-1 AG W/HIV-1&-2 AB AG IA: CPT | Performed by: FAMILY MEDICINE

## 2019-05-07 PROCEDURE — 99213 OFFICE O/P EST LOW 20 MIN: CPT | Mod: 25 | Performed by: FAMILY MEDICINE

## 2019-05-07 PROCEDURE — 84443 ASSAY THYROID STIM HORMONE: CPT | Performed by: FAMILY MEDICINE

## 2019-05-07 PROCEDURE — 36415 COLL VENOUS BLD VENIPUNCTURE: CPT | Performed by: FAMILY MEDICINE

## 2019-05-07 PROCEDURE — 90471 IMMUNIZATION ADMIN: CPT | Performed by: FAMILY MEDICINE

## 2019-05-07 PROCEDURE — 90714 TD VACC NO PRESV 7 YRS+ IM: CPT | Performed by: FAMILY MEDICINE

## 2019-05-07 RX ORDER — NORTRIPTYLINE HYDROCHLORIDE 50 MG/1
CAPSULE ORAL
Qty: 180 CAPSULE | Refills: 1 | Status: SHIPPED | OUTPATIENT
Start: 2019-05-07 | End: 2019-12-04

## 2019-05-07 RX ORDER — PROPRANOLOL HCL 60 MG
60 CAPSULE, EXTENDED RELEASE 24HR ORAL DAILY
Qty: 90 CAPSULE | Refills: 1 | Status: SHIPPED | OUTPATIENT
Start: 2019-05-07 | End: 2019-12-04

## 2019-05-07 RX ORDER — VENLAFAXINE HYDROCHLORIDE 75 MG/1
225 CAPSULE, EXTENDED RELEASE ORAL DAILY
Qty: 270 CAPSULE | Refills: 1 | Status: SHIPPED | OUTPATIENT
Start: 2019-05-07 | End: 2019-08-21

## 2019-05-07 RX ORDER — LEVOTHYROXINE SODIUM 50 UG/1
50 TABLET ORAL DAILY
Qty: 90 TABLET | Refills: 3 | Status: SHIPPED | OUTPATIENT
Start: 2019-05-07 | End: 2019-05-10

## 2019-05-07 ASSESSMENT — ANXIETY QUESTIONNAIRES
3. WORRYING TOO MUCH ABOUT DIFFERENT THINGS: NOT AT ALL
6. BECOMING EASILY ANNOYED OR IRRITABLE: NOT AT ALL
GAD7 TOTAL SCORE: 2
5. BEING SO RESTLESS THAT IT IS HARD TO SIT STILL: NOT AT ALL
2. NOT BEING ABLE TO STOP OR CONTROL WORRYING: SEVERAL DAYS
7. FEELING AFRAID AS IF SOMETHING AWFUL MIGHT HAPPEN: NOT AT ALL
IF YOU CHECKED OFF ANY PROBLEMS ON THIS QUESTIONNAIRE, HOW DIFFICULT HAVE THESE PROBLEMS MADE IT FOR YOU TO DO YOUR WORK, TAKE CARE OF THINGS AT HOME, OR GET ALONG WITH OTHER PEOPLE: SOMEWHAT DIFFICULT
1. FEELING NERVOUS, ANXIOUS, OR ON EDGE: SEVERAL DAYS

## 2019-05-07 ASSESSMENT — PATIENT HEALTH QUESTIONNAIRE - PHQ9
SUM OF ALL RESPONSES TO PHQ QUESTIONS 1-9: 0
5. POOR APPETITE OR OVEREATING: NOT AT ALL

## 2019-05-07 ASSESSMENT — MIFFLIN-ST. JEOR: SCORE: 1451.3

## 2019-05-07 NOTE — PROGRESS NOTES
SUBJECTIVE:   CC: Suni Mckeon is an 53 year old woman who presents for preventive health visit.     Healthy Habits:    Do you get at least three servings of calcium containing foods daily (dairy, green leafy vegetables, etc.)? yes    Amount of exercise or daily activities, outside of work: not consistantly    Problems taking medications regularly No    Medication side effects: No    Have you had an eye exam in the past two years? yes    Do you see a dentist twice per year? yes    Do you have sleep apnea, excessive snoring or daytime drowsiness?no      Depression and Anxiety Follow-Up    Status since last visit: No change    Other associated symptoms:None    Complicating factors:     Significant life event: No     Current substance abuse: None    PHQ 11/13/2018 3/13/2019 5/7/2019   PHQ-9 Total Score 1 0 0   Q9: Thoughts of better off dead/self-harm past 2 weeks Not at all Not at all Not at all     BRUNILDA-7 SCORE 10/30/2018 11/13/2018 5/7/2019   Total Score - - -   Total Score - - -   Total Score 7 0 2       PHQ-9  English  PHQ-9   Any Language  BRUNILDA-7  Suicide Assessment Five-step Evaluation and Treatment (SAFE-T)  Hypothyroidism Follow-up      Since last visit, patient describes the following symptoms: Weight stable, no hair loss, no skin changes, no constipation, no loose stools      Today's PHQ-2 Score:   PHQ-2 ( 1999 [employer]) 5/1/2018 4/28/2018   Q1: Little interest or pleasure in doing things 0 0   Q2: Feeling down, depressed or hopeless 0 0   PHQ-2 Score 0 0   Q1: Little interest or pleasure in doing things - Not at all   Q2: Feeling down, depressed or hopeless - Not at all   PHQ-2 Score - 0       Abuse: Current or Past(Physical, Sexual or Emotional)- No  Do you feel safe in your environment? Yes    Social History     Tobacco Use     Smoking status: Never Smoker     Smokeless tobacco: Never Used   Substance Use Topics     Alcohol use: Yes     Alcohol/week: 0.0 - 1.2 oz     Comment: 2-3 drinks per mth     If  you drink alcohol do you typically have >3 drinks per day or >7 drinks per week? No                     Reviewed orders with patient.  Reviewed health maintenance and updated orders accordingly - Yes  Labs reviewed in EPIC  BP Readings from Last 3 Encounters:   05/07/19 116/80   02/05/19 120/80   11/13/18 132/70    Wt Readings from Last 3 Encounters:   05/07/19 85.7 kg (189 lb)   02/05/19 84.8 kg (187 lb)   11/13/18 83 kg (183 lb)                  Patient Active Problem List   Diagnosis     Anxiety     Major depressive disorder, single episode, moderate (H)     Acquired hypothyroidism     Past Surgical History:   Procedure Laterality Date     COLONOSCOPY N/A 1/27/2017    NL, repeat 10 years     COLONOSCOPY N/A 1/30/2017    Procedure: COLONOSCOPY;  Surgeon: Dahiana Osuna MD;  Location:  GI     SURGICAL HISTORY OF -   2005    Exc. benign lesion left lower eyelid       Social History     Tobacco Use     Smoking status: Never Smoker     Smokeless tobacco: Never Used   Substance Use Topics     Alcohol use: Yes     Alcohol/week: 0.0 - 1.2 oz     Comment: 2-3 drinks per mth     Family History   Problem Relation Age of Onset     Lipids Father      Psychotic Disorder Maternal Grandmother         depression     Diabetes Paternal Grandmother          Current Outpatient Medications   Medication Sig Dispense Refill     levothyroxine (SYNTHROID/LEVOTHROID) 50 MCG tablet Take 1 tablet (50 mcg) by mouth daily 90 tablet 3     nortriptyline (PAMELOR) 50 MG capsule TAKE TWO CAPSULES BY MOUTH DAILY AT BEDTIME 180 capsule 1     propranolol ER (INDERAL LA) 60 MG 24 hr capsule Take 1 capsule (60 mg) by mouth daily 90 capsule 1     venlafaxine (EFFEXOR-XR) 75 MG 24 hr capsule Take 3 capsules (225 mg) by mouth daily 270 capsule 1       Mammogram Screening: Patient over age 50, mutual decision to screen reflected in health maintenance.    Pertinent mammograms are reviewed under the imaging tab.  History of abnormal Pap smear: NO -  "age 30-65 PAP every 5 years with negative HPV co-testing recommended  PAP / HPV Latest Ref Rng & Units 5/1/2018 7/9/2014 12/9/2010   PAP - NIL NIL NIL   HPV 16 DNA NEG:Negative Negative - -   HPV 18 DNA NEG:Negative Negative - -   OTHER HR HPV NEG:Negative Negative - -     Reviewed and updated as needed this visit by clinical staff  Tobacco  Allergies  Meds  Problems  Med Hx  Surg Hx  Fam Hx  Soc Hx          Reviewed and updated as needed this visit by Provider  Allergies  Problems            ROS:  CONSTITUTIONAL: NEGATIVE for fever, chills, change in weight  INTEGUMENTARU/SKIN: NEGATIVE for worrisome rashes, moles or lesions  EYES: NEGATIVE for vision changes or irritation  ENT: NEGATIVE for ear, mouth and throat problems  RESP: NEGATIVE for significant cough or SOB  BREAST: NEGATIVE for masses, tenderness or discharge  CV: NEGATIVE for chest pain, palpitations or peripheral edema  GI: NEGATIVE for nausea, abdominal pain, heartburn, or change in bowel habits  : NEGATIVE for unusual urinary or vaginal symptoms. Periods are regular.  MUSCULOSKELETAL: NEGATIVE for significant arthralgias or myalgia  NEURO: NEGATIVE for weakness, dizziness or paresthesias  PSYCHIATRIC: NEGATIVE for changes in mood or affect    OBJECTIVE:   /80   Pulse 79   Ht 1.64 m (5' 4.57\")   Wt 85.7 kg (189 lb)   LMP 12/13/2016 (LMP Unknown)   BMI 31.87 kg/m    EXAM:  GENERAL: healthy, alert and no distress  EYES: Eyes grossly normal to inspection, PERRL and conjunctivae and sclerae normal  HENT: ear canals and TM's normal, nose and mouth without ulcers or lesions  NECK: no adenopathy, no asymmetry, masses, or scars and thyroid normal to palpation  RESP: lungs clear to auscultation - no rales, rhonchi or wheezes  BREAST: normal without masses, tenderness or nipple discharge and no palpable axillary masses or adenopathy  CV: regular rate and rhythm, normal S1 S2, no S3 or S4, no murmur, click or rub, no peripheral edema and " "peripheral pulses strong  ABDOMEN: soft, nontender, no hepatosplenomegaly, no masses and bowel sounds normal  MS: no gross musculoskeletal defects noted, no edema  SKIN: no suspicious lesions or rashes  NEURO: Normal strength and tone, mentation intact and speech normal  PSYCH: mentation appears normal, affect normal/bright          ASSESSMENT/PLAN:   1. Annual physical exam      2. Acquired hypothyroidism  Thyroid function test reordered.  Previously well managed.  Refill medication ordered.  - levothyroxine (SYNTHROID/LEVOTHROID) 50 MCG tablet; Take 1 tablet (50 mcg) by mouth daily  Dispense: 90 tablet; Refill: 3  - TSH    3. Major depressive disorder, single episode, moderate (H)  Well-controlled.  Patient would like to continue same medications at this time  - nortriptyline (PAMELOR) 50 MG capsule; TAKE TWO CAPSULES BY MOUTH DAILY AT BEDTIME  Dispense: 180 capsule; Refill: 1  - venlafaxine (EFFEXOR-XR) 75 MG 24 hr capsule; Take 3 capsules (225 mg) by mouth daily  Dispense: 270 capsule; Refill: 1    4. Anxiety  Well-controlled.  Patient would like to continue same medications at this time.  Follow-up in 6 months for recheck  - propranolol ER (INDERAL LA) 60 MG 24 hr capsule; Take 1 capsule (60 mg) by mouth daily  Dispense: 90 capsule; Refill: 1    5. Encounter for screening for HIV    - HIV Antigen Antibody Combo    6. Need for vaccination    - TD PRSERV FREE >=7 YRS ADS IM [35233]  - 1st  Administration  [82439]    COUNSELING:   Reviewed preventive health counseling, as reflected in patient instructions       Regular exercise       Healthy diet/nutrition    BP Readings from Last 1 Encounters:   05/07/19 116/80     Estimated body mass index is 31.87 kg/m  as calculated from the following:    Height as of this encounter: 1.64 m (5' 4.57\").    Weight as of this encounter: 85.7 kg (189 lb).      Weight management plan: Discussed healthy diet and exercise guidelines     reports that she has never smoked. She has " never used smokeless tobacco.      Counseling Resources:  ATP IV Guidelines  Pooled Cohorts Equation Calculator  Breast Cancer Risk Calculator  FRAX Risk Assessment  ICSI Preventive Guidelines  Dietary Guidelines for Americans, 2010  USDA's MyPlate  ASA Prophylaxis  Lung CA Screening    Lou Ricardo MD  [facility] [place]

## 2019-05-08 LAB — TSH SERPL DL<=0.005 MIU/L-ACNC: 4.91 MU/L (ref 0.4–4)

## 2019-05-08 ASSESSMENT — ANXIETY QUESTIONNAIRES: GAD7 TOTAL SCORE: 2

## 2019-05-09 LAB — HIV 1+2 AB+HIV1 P24 AG SERPL QL IA: NONREACTIVE

## 2019-05-10 RX ORDER — LEVOTHYROXINE SODIUM 75 UG/1
75 TABLET ORAL DAILY
Qty: 90 TABLET | Refills: 1 | Status: SHIPPED | OUTPATIENT
Start: 2019-05-10 | End: 2019-11-19

## 2019-08-19 ENCOUNTER — TELEPHONE (OUTPATIENT)
Dept: FAMILY MEDICINE | Facility: CLINIC | Age: 54
End: 2019-08-19

## 2019-08-19 DIAGNOSIS — F32.1 MAJOR DEPRESSIVE DISORDER, SINGLE EPISODE, MODERATE (H): ICD-10-CM

## 2019-08-19 NOTE — TELEPHONE ENCOUNTER
Please check with our pharmacy at [place] if they have this dose available.      Patient can transfer the prescription to a different pharmacy where the medication is available for this month    Lou Ricardo MD  [facility], [place]

## 2019-08-19 NOTE — TELEPHONE ENCOUNTER
S/w [facility] pharmacy and told they are out of the venlafaxine er 75 mg capsules and not able to get.  S/w [facility] EP who states they are out of the medication also and not able to get from .    Non detailed message left for pt to return call to clinic and ask to speak with a triage nurse.  Pt can try calling around and have rx transferred if finds medication at different pharmacy or can have md send in new rx for either venlafaxine 37.5 mg take 2 tabs daily or 150 mg take 1/2 tab daily.    Shyann INFANTE RN  EP Triage

## 2019-08-20 DIAGNOSIS — F32.1 MAJOR DEPRESSIVE DISORDER, SINGLE EPISODE, MODERATE (H): ICD-10-CM

## 2019-08-20 NOTE — TELEPHONE ENCOUNTER
Attempted to call patient regarding RN note below. Left a non-detailed message and call back number [phone].     Екатерина Harding RN, BSN  [facility] [place]

## 2019-08-20 NOTE — TELEPHONE ENCOUNTER
Fax from pharmacy  Alternative requested-product unavailable.   Requesting new scripts for 37.5mg or 150mg

## 2019-08-21 RX ORDER — VENLAFAXINE HYDROCHLORIDE 75 MG/1
225 CAPSULE, EXTENDED RELEASE ORAL DAILY
Qty: 270 CAPSULE | Refills: 1 | OUTPATIENT
Start: 2019-08-21

## 2019-08-21 RX ORDER — VENLAFAXINE HYDROCHLORIDE 150 MG/1
TABLET, EXTENDED RELEASE ORAL
Qty: 45 TABLET | Refills: 1 | Status: SHIPPED | OUTPATIENT
Start: 2019-08-21 | End: 2019-10-22

## 2019-08-21 NOTE — TELEPHONE ENCOUNTER
S/w pt and explained the venlafaxine er 75 mg capsules are not available at this time.  MD could write rx for 37.5 mg or 150 mg.    If rx was written for 37.5 mg pt would have to take 6 tabs daily.  If rx written for 150 mg tabs pt would take 1.5 tabs daily.    Pt would like venlafaxine 150 mg tablets take 1.5 tabs PO daily (current dosing is 225 mg daily and takes 3 75 mg capsules).    Pharmacy pended.    Shyann INFANTE RN  EP Triage

## 2019-08-21 NOTE — TELEPHONE ENCOUNTER
Called patient to inform her of MD response below. Patient/parent verbalized understanding and agrees with plan.     Екатерина Harding RN, BSN  [facility] [place]

## 2019-08-21 NOTE — TELEPHONE ENCOUNTER
"Last Written Prescription Date:  5/7/19  Last Fill Quantity: 270 capsules,  # refills: 1   Last office visit: 5/7/2019 with prescribing provider:  Car   Future Office Visit:      Requested Prescriptions   Pending Prescriptions Disp Refills     venlafaxine (EFFEXOR-XR) 75 MG 24 hr capsule 270 capsule 1     Sig: Take 3 capsules (225 mg) by mouth daily       Serotonin-Norepinephrine Reuptake Inhibitors  Failed - 8/20/2019  5:33 PM        Failed - Normal serum creatinine on file in past 12 months     Recent Labs   Lab Test 05/01/18  0902   CR 0.84             Passed - Blood pressure under 140/90 in past 12 months     BP Readings from Last 3 Encounters:   05/07/19 116/80   02/05/19 120/80   11/13/18 132/70                 Passed - PHQ-9 score of less than 5 in past 6 months     Please review last PHQ-9 score.           Passed - Medication is active on med list        Passed - Patient is age 18 or older        Passed - No active pregnancy on record        Passed - No positive pregnancy test in past 12 months        Passed - Recent (6 mo) or future (30 days) visit within the authorizing provider's specialty     Patient had office visit in the last 6 months or has a visit in the next 30 days with authorizing provider or within the authorizing provider's specialty.  See \"Patient Info\" tab in inbasket, or \"Choose Columns\" in Meds & Orders section of the refill encounter.              "

## 2019-08-21 NOTE — TELEPHONE ENCOUNTER
150 mg Effexor tablets ordered.  Take 1-1/2 tablets daily.    Lou Ricardo MD,MD  [facility], [place]

## 2019-09-04 DIAGNOSIS — E03.9 ACQUIRED HYPOTHYROIDISM: ICD-10-CM

## 2019-09-04 NOTE — TELEPHONE ENCOUNTER
"Requested Prescriptions   Pending Prescriptions Disp Refills     levothyroxine (SYNTHROID/LEVOTHROID) 75 MCG tablet [Pharmacy Med Name: LEVOTHYROXINE 75 MCG TABLET] 90 tablet 1     Sig: TAKE 1 TABLET BY MOUTH EVERY DAY   Last Written Prescription Date:  5/10/19  Last Fill Quantity: 90,  # refills: 1   Last office visit: 5/7/2019 with prescribing provider:  Davion    Future Office Visit:        Thyroid Protocol Failed - 9/4/2019  5:06 PM        Failed - Normal TSH on file in past 12 months     Recent Labs   Lab Test 05/07/19  1708   TSH 4.91*              Passed - Patient is 12 years or older        Passed - Recent (12 mo) or future (30 days) visit within the authorizing provider's specialty     Patient had office visit in the last 12 months or has a visit in the next 30 days with authorizing provider or within the authorizing provider's specialty.  See \"Patient Info\" tab in inbasket, or \"Choose Columns\" in Meds & Orders section of the refill encounter.              Passed - Medication is active on med list        Passed - No active pregnancy on record     If patient is pregnant or has had a positive pregnancy test, please check TSH.          Passed - No positive pregnancy test in past 12 months     If patient is pregnant or has had a positive pregnancy test, please check TSH.            "

## 2019-09-05 RX ORDER — LEVOTHYROXINE SODIUM 75 UG/1
TABLET ORAL
Qty: 90 TABLET | Refills: 1 | OUTPATIENT
Start: 2019-09-05

## 2019-09-05 NOTE — TELEPHONE ENCOUNTER
Duplicate- sent 5/10/19 #90 x1 refill- info sent to pharmacy.  Anaya CHENRN  [facility]  [phone]

## 2019-10-02 ENCOUNTER — HEALTH MAINTENANCE LETTER (OUTPATIENT)
Age: 54
End: 2019-10-02

## 2019-10-22 DIAGNOSIS — F32.1 MAJOR DEPRESSIVE DISORDER, SINGLE EPISODE, MODERATE (H): ICD-10-CM

## 2019-10-22 RX ORDER — VENLAFAXINE HYDROCHLORIDE 150 MG/1
TABLET, EXTENDED RELEASE ORAL
Qty: 45 TABLET | Refills: 0 | Status: SHIPPED | OUTPATIENT
Start: 2019-10-22 | End: 2019-11-22

## 2019-10-22 RX ORDER — VENLAFAXINE HYDROCHLORIDE 150 MG/1
CAPSULE, EXTENDED RELEASE ORAL
Qty: 30 CAPSULE | Refills: 0 | OUTPATIENT
Start: 2019-10-22

## 2019-10-22 NOTE — LETTER
October 31, 2019      Suni Mckeon  [address]  [place] [address]        Dear Suni,    I care about your health and have reviewed your health plan. I have reviewed your medical conditions, medication list, and lab results and am making recommendations based on this review, to better manage your health.    You are in particular need of attention regarding:  -Medication     I am recommending that you:  -Schedule an appointment    Here is a list of Health Maintenance topics that are due now or due soon:  Health Maintenance Due   Topic Date Due     Discuss Advance Care Planning  1965     Zoster (Shingles) Vaccine (1 of 2) 06/08/2014     Flu Vaccine (1) 09/01/2019     Depression Assessment  11/07/2019       Please call us at [phone] (or use [employer]) to address the above recommendations.     Thank you for trusting [facility] and we appreciate the opportunity to serve you.  We look forward to supporting your healthcare needs in the future.    Healthy Regards,    Lou Ricardo MD

## 2019-10-22 NOTE — TELEPHONE ENCOUNTER
"Requested Prescriptions   Pending Prescriptions Disp Refills     venlafaxine (EFFEXOR-XR) 150 MG 24 hr capsule [Pharmacy Med Name: VENLAFAXINE HCL  MG CAP] 30 capsule 0     Sig: TAKE 1 CAPSULE EVERY DAY  Last Written Prescription Date:  8/21/19  Last Fill Quantity: 45,  # refills: 1   Last office visit: 5/7/2019 with prescribing provider:  Davion   Future Office Visit:           Serotonin-Norepinephrine Reuptake Inhibitors  Failed - 10/22/2019  1:30 AM        Failed - Normal serum creatinine on file in past 12 months     Recent Labs   Lab Test 05/01/18  0902   CR 0.84             Passed - Blood pressure under 140/90 in past 12 months     BP Readings from Last 3 Encounters:   05/07/19 116/80   02/05/19 120/80   11/13/18 132/70                 Passed - Recent (12 mo) or future (30 days) visit within the authorizing provider's specialty     Patient has had an office visit with the authorizing provider or a provider within the authorizing providers department within the previous 12 mos or has a future within next 30 days. See \"Patient Info\" tab in inbasket, or \"Choose Columns\" in Meds & Orders section of the refill encounter.              Passed - Medication is active on med list        Passed - Patient is age 18 or older        Passed - No active pregnancy on record        Passed - No positive pregnancy test in past 12 months          "

## 2019-10-22 NOTE — TELEPHONE ENCOUNTER
Rx for venlafaxine 150 mg ER take 1.5 tabs daily # 45 sent to pharmacy and pt needs to be seen in the next 30 days for an office visit for med check.    Shyann INFANTE RN  EP Triage

## 2019-10-23 NOTE — TELEPHONE ENCOUNTER
Pharmacy note:    Alternative requested: venlafaxine 150mg tabs. Insurance max limit 1 tab daily.     PA Needed.     [employer] store # [id number]

## 2019-10-25 NOTE — TELEPHONE ENCOUNTER
Left message fo rpt to call back : pt needs to be seen in the next 30 days for an office visit for med check.

## 2019-10-31 NOTE — TELEPHONE ENCOUNTER
Meche Call contacted Suni on 10/31/19 and left a message. If patient calls back please schedule appointment as soon as possible for med check. Letter sent.      .Meche MILLER    [facility] [place]

## 2019-11-19 DIAGNOSIS — E03.9 ACQUIRED HYPOTHYROIDISM: ICD-10-CM

## 2019-11-19 RX ORDER — LEVOTHYROXINE SODIUM 75 UG/1
TABLET ORAL
Qty: 90 TABLET | Refills: 0 | Status: SHIPPED | OUTPATIENT
Start: 2019-11-19 | End: 2020-02-17

## 2019-11-19 NOTE — LETTER
November 25, 2019      Suni Mckeon  [address]  [place] [address]        Dear Suni,    I care about your health and have reviewed your health plan. I have reviewed your medical conditions, medication list, and lab results and am making recommendations based on this review, to better manage your health.    You are in particular need of attention regarding:  -Labs    I am recommending that you:  -Schedule a lab appointment    Here is a list of Health Maintenance topics that are due now or due soon:  Health Maintenance Due   Topic Date Due     Discuss Advance Care Planning  1965     Zoster (Shingles) Vaccine (1 of 2) 06/08/2014     Flu Vaccine (1) 09/01/2019     Depression Assessment  11/07/2019       Please call us at [phone] (or use [employer]) to address the above recommendations.     Thank you for trusting [facility] and we appreciate the opportunity to serve you.  We look forward to supporting your healthcare needs in the future.    Healthy Regards,    Lou Ricardo MD

## 2019-11-19 NOTE — TELEPHONE ENCOUNTER
Medication refill ordered.  Future lab for TSH ordered.  Please have the patient come in for lab visit.    Lou Ricardo MD,MD  [facility], [place]

## 2019-11-19 NOTE — TELEPHONE ENCOUNTER
"Last Written Prescription Date: 5/10/19   Last Fill Quantity: 90,  # refills: 1   Last office visit: 5/7/2019 with prescribing provider:  Davion  Future Office Visit:      Requested Prescriptions   Pending Prescriptions Disp Refills     levothyroxine (SYNTHROID/LEVOTHROID) 75 MCG tablet [Pharmacy Med Name: LEVOTHYROXINE 75 MCG TABLET] 90 tablet 1     Sig: TAKE 1 TABLET BY MOUTH EVERY DAY       Thyroid Protocol Failed - 11/19/2019  1:22 PM        Failed - Normal TSH on file in past 12 months     Recent Labs   Lab Test 05/07/19  1708   TSH 4.91*              Passed - Patient is 12 years or older        Passed - Recent (12 mo) or future (30 days) visit within the authorizing provider's specialty     Patient has had an office visit with the authorizing provider or a provider within the authorizing providers department within the previous 12 mos or has a future within next 30 days. See \"Patient Info\" tab in inbasket, or \"Choose Columns\" in Meds & Orders section of the refill encounter.              Passed - Medication is active on med list        Passed - No active pregnancy on record     If patient is pregnant or has had a positive pregnancy test, please check TSH.          Passed - No positive pregnancy test in past 12 months     If patient is pregnant or has had a positive pregnancy test, please check TSH.            "

## 2019-11-19 NOTE — TELEPHONE ENCOUNTER
Routing to team to inform and assist with scheduling.     Екатерина Harding RN, BSN  [facility] [place]

## 2019-11-21 NOTE — TELEPHONE ENCOUNTER
Meche Call contacted Suni on 11/21/19 and left a message. If patient calls back please schedule appointment as soon as possible for a lab visit.      .Meche MILLER    [facility] [place]

## 2019-11-23 DIAGNOSIS — F32.1 MAJOR DEPRESSIVE DISORDER, SINGLE EPISODE, MODERATE (H): ICD-10-CM

## 2019-11-25 NOTE — TELEPHONE ENCOUNTER
Meche Call contacted Suni on 11/25/19 and left a message. If patient calls back please schedule appointment as soon as possible for a lab visit. Letter sent.      .Meche MILLER    [facility] [place]

## 2019-11-25 NOTE — TELEPHONE ENCOUNTER
"  Sent phq9 via [employer]    Last Written Prescription Date:  11/22/19  Last Fill Quantity: 45,  # refills:   0  Last office visit: 5/7/2019 with prescribing provider:     Future Office Visit:    Requested Prescriptions   Pending Prescriptions Disp Refills     venlafaxine (EFFEXOR-XR) 150 MG 24 hr capsule [Pharmacy Med Name: VENLAFAXINE HCL  MG CAP] 45 capsule 0     Sig: TAKE 1 CAPSULE BY MOUTH DAILY WITH THE 75 MG CAP       Serotonin-Norepinephrine Reuptake Inhibitors  Failed - 11/23/2019  8:33 AM        Failed - PHQ-9 score of less than 5 in past 6 months     Please review last PHQ-9 score.  PHQ-9 SCORE 11/13/2018 3/13/2019 5/7/2019   PHQ-9 Total Score - - -   PHQ-9 Total Score MyChart - - -   PHQ-9 Total Score 1 0 0              Failed - Normal serum creatinine on file in past 12 months     Recent Labs   Lab Test 05/01/18  0902   CR 0.84             Failed - Recent (6 mo) or future (30 days) visit within the authorizing provider's specialty     Patient had office visit in the last 6 months or has a visit in the next 30 days with authorizing provider or within the authorizing provider's specialty.  See \"Patient Info\" tab in inbasket, or \"Choose Columns\" in Meds & Orders section of the refill encounter.            Passed - Blood pressure under 140/90 in past 12 months     BP Readings from Last 3 Encounters:   05/07/19 116/80   02/05/19 120/80   11/13/18 132/70                 Passed - Medication is active on med list        Passed - Patient is age 18 or older        Passed - No active pregnancy on record        Passed - No positive pregnancy test in past 12 months          "

## 2019-11-26 NOTE — TELEPHONE ENCOUNTER
Attempted to call patient to do PHQ9 screening for depression.  Left a non-detailed message and call back number [phone].     Patient is due for labs: CR is overdue since 2018  Patient would be due for 6 month depression follow up (can route to provider if they are okay with refilling based on PHQ9 screening).    Екатерина Harding RN, BSN  [facility] [place]

## 2019-11-27 NOTE — TELEPHONE ENCOUNTER
2nd attempt: Left a non-detailed message and call back number [phone].     Екатерина Harding RN, BSN  [facility] [place]

## 2019-11-29 RX ORDER — VENLAFAXINE HYDROCHLORIDE 150 MG/1
CAPSULE, EXTENDED RELEASE ORAL
Qty: 45 CAPSULE | Refills: 0 | OUTPATIENT
Start: 2019-11-29

## 2019-12-03 ASSESSMENT — PATIENT HEALTH QUESTIONNAIRE - PHQ9: SUM OF ALL RESPONSES TO PHQ QUESTIONS 1-9: 0

## 2019-12-03 NOTE — TELEPHONE ENCOUNTER
PHQ-9 SCORE 3/13/2019 5/7/2019 12/3/2019   PHQ-9 Total Score - - -   PHQ-9 Total Score MyChart - - -   PHQ-9 Total Score 0 0 0     PHQ updated. Advised patient she is also due for OV and labs. She scheduled appt for tomorrow.     Brynn Gonzales RN   [facility] - Triage

## 2019-12-04 ENCOUNTER — OFFICE VISIT (OUTPATIENT)
Dept: FAMILY MEDICINE | Facility: CLINIC | Age: 54
End: 2019-12-04
Payer: COMMERCIAL

## 2019-12-04 VITALS
HEART RATE: 84 BPM | OXYGEN SATURATION: 97 % | BODY MASS INDEX: 32.42 KG/M2 | SYSTOLIC BLOOD PRESSURE: 122 MMHG | WEIGHT: 194.6 LBS | DIASTOLIC BLOOD PRESSURE: 82 MMHG | HEIGHT: 65 IN | TEMPERATURE: 98.6 F

## 2019-12-04 DIAGNOSIS — E03.9 ACQUIRED HYPOTHYROIDISM: ICD-10-CM

## 2019-12-04 DIAGNOSIS — F32.1 MAJOR DEPRESSIVE DISORDER, SINGLE EPISODE, MODERATE (H): Primary | ICD-10-CM

## 2019-12-04 DIAGNOSIS — F41.9 ANXIETY: ICD-10-CM

## 2019-12-04 PROCEDURE — 99213 OFFICE O/P EST LOW 20 MIN: CPT | Performed by: FAMILY MEDICINE

## 2019-12-04 PROCEDURE — 36415 COLL VENOUS BLD VENIPUNCTURE: CPT | Performed by: FAMILY MEDICINE

## 2019-12-04 PROCEDURE — 84443 ASSAY THYROID STIM HORMONE: CPT | Performed by: FAMILY MEDICINE

## 2019-12-04 RX ORDER — NORTRIPTYLINE HYDROCHLORIDE 50 MG/1
CAPSULE ORAL
Qty: 180 CAPSULE | Refills: 1 | Status: SHIPPED | OUTPATIENT
Start: 2019-12-04 | End: 2020-06-05

## 2019-12-04 RX ORDER — PROPRANOLOL HCL 60 MG
60 CAPSULE, EXTENDED RELEASE 24HR ORAL DAILY
Qty: 90 CAPSULE | Refills: 1 | Status: SHIPPED | OUTPATIENT
Start: 2019-12-04 | End: 2020-07-01

## 2019-12-04 RX ORDER — VENLAFAXINE HYDROCHLORIDE 150 MG/1
150 TABLET, EXTENDED RELEASE ORAL DAILY
Qty: 90 TABLET | Refills: 1 | Status: SHIPPED | OUTPATIENT
Start: 2019-12-04 | End: 2020-07-01

## 2019-12-04 RX ORDER — VENLAFAXINE HYDROCHLORIDE 75 MG/1
75 TABLET, EXTENDED RELEASE ORAL DAILY
Qty: 90 EACH | Refills: 1 | Status: SHIPPED | OUTPATIENT
Start: 2019-12-04 | End: 2020-07-01

## 2019-12-04 ASSESSMENT — MIFFLIN-ST. JEOR: SCORE: 1470.64

## 2019-12-04 NOTE — PROGRESS NOTES
Subjective     Suni Mckeon is a 54 year old female who presents to clinic today for the following health issues:    HPI   Depression and anxiety Followup    How are you doing with your depression since your last visit? No change    Are you having other symptoms that might be associated with depression? No    Have you had a significant life event?  No     Are you feeling anxious or having panic attacks?   No    Do you have any concerns with your use of alcohol or other drugs? No    Social History     Tobacco Use     Smoking status: Never Smoker     Smokeless tobacco: Never Used   Substance Use Topics     Alcohol use: Yes     Alcohol/week: 0.0 - 2.0 standard drinks     Comment: 2-3 drinks per mth     Drug use: No     PHQ 3/13/2019 5/7/2019 12/3/2019   PHQ-9 Total Score 0 0 0   Q9: Thoughts of better off dead/self-harm past 2 weeks Not at all Not at all Not at all     BRUNILDA-7 SCORE 10/30/2018 11/13/2018 5/7/2019   Total Score - - -   Total Score - - -   Total Score 7 0 2     Last PHQ-9 12/3/2019   1.  Little interest or pleasure in doing things 0   2.  Feeling down, depressed, or hopeless 0   3.  Trouble falling or staying asleep, or sleeping too much 0   4.  Feeling tired or having little energy 0   5.  Poor appetite or overeating 0   6.  Feeling bad about yourself 0   7.  Trouble concentrating 0   8.  Moving slowly or restless 0   Q9: Thoughts of better off dead/self-harm past 2 weeks 0   PHQ-9 Total Score 0   Difficulty at work, home, or with people Not difficult at all         Suicide Assessment Five-step Evaluation and Treatment (SAFE-T)      How many days per week do you miss taking your medication? 0    Hypothyroidism Follow-up      Since last visit, patient describes the following symptoms: Weight stable, no hair loss, no skin changes, no constipation, no loose stools      Patient Active Problem List   Diagnosis     Anxiety     Major depressive disorder, single episode, moderate (H)     Acquired  hypothyroidism     Past Surgical History:   Procedure Laterality Date     COLONOSCOPY N/A 1/27/2017    NL, repeat 10 years     COLONOSCOPY N/A 1/30/2017    Procedure: COLONOSCOPY;  Surgeon: Dahiana Osuna MD;  Location:  GI     SURGICAL HISTORY OF -   2005    Exc. benign lesion left lower eyelid       Social History     Tobacco Use     Smoking status: Never Smoker     Smokeless tobacco: Never Used   Substance Use Topics     Alcohol use: Yes     Alcohol/week: 0.0 - 2.0 standard drinks     Comment: 2-3 drinks per mth     Family History   Problem Relation Age of Onset     Lipids Father      Psychotic Disorder Maternal Grandmother         depression     Diabetes Paternal Grandmother          Current Outpatient Medications   Medication Sig Dispense Refill     levothyroxine (SYNTHROID/LEVOTHROID) 75 MCG tablet TAKE 1 TABLET BY MOUTH EVERY DAY 90 tablet 0     nortriptyline (PAMELOR) 50 MG capsule TAKE TWO CAPSULES BY MOUTH DAILY AT BEDTIME 180 capsule 1     propranolol ER (INDERAL LA) 60 MG 24 hr capsule Take 1 capsule (60 mg) by mouth daily 90 capsule 1     venlafaxine (EFFEXOR-ER) 150 MG 24 hr tablet Take 1 tablet (150 mg) by mouth daily Take 150 mg every day along with 75 mg QD 90 tablet 1     venlafaxine (EFFEXOR-ER) 75 MG 24 hr tablet Take 1 tablet (75 mg) by mouth daily 90 each 1     Allergies   Allergen Reactions     Bupropion Itching     Tobramycin Visual Disturbance     used for pink eye and eyes became more irritated, burning and itchy     Recent Labs   Lab Test 05/07/19  1708 05/01/18  0902  07/09/14  0835 04/13/12  1240   LDL  --  121*  --  125 127   HDL  --  50  --  42* 41*   TRIG  --  49  --  77 85   ALT  --   --   --  24 17   CR  --  0.84  --  0.72 0.63   GFRESTIMATED  --  70  --  86 >90   GFRESTBLACK  --  85  --  >90 >90   POTASSIUM  --  4.1  --  4.0 4.0   TSH 4.91* 3.85   < > 4.40 4.05    < > = values in this interval not displayed.        Reviewed and updated as needed this visit by Provider      "    Review of Systems   ROS COMP: CONSTITUTIONAL: NEGATIVE for fever, chills, change in weight  ENT/MOUTH: NEGATIVE for ear, mouth and throat problems  RESP: NEGATIVE for significant cough or SOB  CV: NEGATIVE for chest pain, palpitations or peripheral edema      Objective    /82 (BP Location: Right arm, Patient Position: Chair, Cuff Size: Adult Large)   Pulse 84   Temp 98.6  F (37  C) (Oral)   Ht 1.638 m (5' 4.5\")   Wt 88.3 kg (194 lb 9.6 oz)   LMP 12/13/2016 (LMP Unknown)   SpO2 97%   BMI 32.89 kg/m    Body mass index is 32.89 kg/m .  Physical Exam   GENERAL: healthy, alert and no distress  NECK: no adenopathy, no asymmetry, masses, or scars and thyroid normal to palpation  RESP: lungs clear to auscultation - no rales, rhonchi or wheezes  CV: regular rate and rhythm, normal S1 S2, no S3 or S4, no murmur, click or rub, no peripheral edema and peripheral pulses strong  ABDOMEN: soft, nontender, no hepatosplenomegaly, no masses and bowel sounds normal  PSYCH: mentation appears normal, affect normal/bright            Assessment & Plan     1. Major depressive disorder, single episode, moderate (H)  Well-controlled.  Patient would like to continue same medications.  - venlafaxine (EFFEXOR-ER) 150 MG 24 hr tablet; Take 1 tablet (150 mg) by mouth daily Take 150 mg every day along with 75 mg QD  Dispense: 90 tablet; Refill: 1  - venlafaxine (EFFEXOR-ER) 75 MG 24 hr tablet; Take 1 tablet (75 mg) by mouth daily  Dispense: 90 each; Refill: 1  - nortriptyline (PAMELOR) 50 MG capsule; TAKE TWO CAPSULES BY MOUTH DAILY AT BEDTIME  Dispense: 180 capsule; Refill: 1    2. Anxiety  Well-controlled.  Patient would like to resume current medications as before.  - propranolol ER (INDERAL LA) 60 MG 24 hr capsule; Take 1 capsule (60 mg) by mouth daily  Dispense: 90 capsule; Refill: 1    3. Acquired hypothyroidism  Patient currently has refill on thyroid medication.  TSH ordered.  - TSH     BMI:   Estimated body mass index is " "32.89 kg/m  as calculated from the following:    Height as of this encounter: 1.638 m (5' 4.5\").    Weight as of this encounter: 88.3 kg (194 lb 9.6 oz).         Return in about 5 months (around 5/8/2020) for Annual Physical Exam.    Lou Ricardo MD  [facility] [place]    "

## 2019-12-05 LAB — TSH SERPL DL<=0.005 MIU/L-ACNC: 2.93 MU/L (ref 0.4–4)

## 2020-02-17 DIAGNOSIS — E03.9 ACQUIRED HYPOTHYROIDISM: ICD-10-CM

## 2020-02-17 RX ORDER — LEVOTHYROXINE SODIUM 75 UG/1
TABLET ORAL
Qty: 90 TABLET | Refills: 3 | Status: SHIPPED | OUTPATIENT
Start: 2020-02-17 | End: 2021-02-24

## 2020-02-17 NOTE — TELEPHONE ENCOUNTER
"Requested Prescriptions   Pending Prescriptions Disp Refills     levothyroxine (SYNTHROID/LEVOTHROID) 75 MCG tablet [Pharmacy Med Name: LEVOTHYROXINE 75 MCG TABLET] 90 tablet 0     Sig: TAKE 1 TABLET BY MOUTH EVERY DAY       Thyroid Protocol Passed - 2/17/2020  1:19 AM        Passed - Patient is 12 years or older        Passed - Recent (12 mo) or future (30 days) visit within the authorizing provider's specialty     Patient has had an office visit with the authorizing provider or a provider within the authorizing providers department within the previous 12 mos or has a future within next 30 days. See \"Patient Info\" tab in inbasket, or \"Choose Columns\" in Meds & Orders section of the refill encounter.              Passed - Medication is active on med list        Passed - Normal TSH on file in past 12 months     Recent Labs   Lab Test 12/04/19  1348   TSH 2.93              Passed - No active pregnancy on record     If patient is pregnant or has had a positive pregnancy test, please check TSH.          Passed - No positive pregnancy test in past 12 months     If patient is pregnant or has had a positive pregnancy test, please check TSH.          levothyroxine (SYNTHROID/LEVOTHROID) 75 MCG tablet 90 tablet 0 11/19/2019        Last Written Prescription Date:  11/19/2019  Last Fill Quantity: 90,  # refills: 0   Last office visit: 12/4/2019 with prescribing provider:  Dr. Ricardo   Future Office Visit:  Unknown     "

## 2020-02-17 NOTE — TELEPHONE ENCOUNTER
Prescription approved per [facility] Refill Protocol.    Екатерина Harding RN, BSN  [facility] [place]

## 2020-06-05 DIAGNOSIS — F32.1 MAJOR DEPRESSIVE DISORDER, SINGLE EPISODE, MODERATE (H): ICD-10-CM

## 2020-06-05 NOTE — TELEPHONE ENCOUNTER
Routing refill request to provider for review/approval because:  Drug interaction warning    JENNIFER CoelloN, RN  [employer] [facility]

## 2020-06-08 RX ORDER — NORTRIPTYLINE HYDROCHLORIDE 50 MG/1
CAPSULE ORAL
Qty: 60 CAPSULE | Refills: 0 | Status: SHIPPED | OUTPATIENT
Start: 2020-06-08 | End: 2020-07-08

## 2020-06-08 NOTE — TELEPHONE ENCOUNTER
1 month supply of the medication ordered.  Please notify the patient that I would like her to have a video visit with me in a month's time before running out of medications for follow up of mood disorder.      Lou Ricardo MD  [facility], [place]

## 2020-06-08 NOTE — TELEPHONE ENCOUNTER
Routing to team to inform and assist with scheduling. Thank you very much.     Екатерина Harding RN, BSN  [facility] [place]

## 2020-06-10 NOTE — TELEPHONE ENCOUNTER
Called and vm was full. [employer] message sent.    .Meche MILLER    [facility] [place]

## 2020-06-30 NOTE — PROGRESS NOTES
"Suni Mckeon is a 55 year old female who is being evaluated via a billable video visit.      The patient has been notified of following:     \"This video visit will be conducted via a call between you and your physician/provider. We have found that certain health care needs can be provided without the need for an in-person physical exam.  This service lets us provide the care you need with a video conversation.  If a prescription is necessary we can send it directly to your pharmacy.  If lab work is needed we can place an order for that and you can then stop by our lab to have the test done at a later time.    Video visits are billed at different rates depending on your insurance coverage.  Please reach out to your insurance provider with any questions.    If during the course of the call the physician/provider feels a video visit is not appropriate, you will not be charged for this service.\"    Patient has given verbal consent for Video visit? Yes  How would you like to obtain your AVS? [employer]  Patient would like the video invitation sent by: [employer] -- [place] - [phone]  Will anyone else be joining your video visit? No      Subjective     Suni Mckeon is a 55 year old female who presents today via video visit for the following health issues:    HPI  Depression and Anxiety Follow-Up    How are you doing with your depression since your last visit? Stable    How are you doing with your anxiety since your last visit?  Stable    Are you having other symptoms that might be associated with depression or anxiety? No    Have you had a significant life event? No     Do you have any concerns with your use of alcohol or other drugs? No    Social History     Tobacco Use     Smoking status: Never Smoker     Smokeless tobacco: Never Used   Substance Use Topics     Alcohol use: Yes     Alcohol/week: 0.0 - 2.0 standard drinks     Comment: 2-3 drinks per mth     Drug use: No     PHQ 5/7/2019 12/3/2019 7/1/2020 "   PHQ-9 Total Score 0 0 0   Q9: Thoughts of better off dead/self-harm past 2 weeks Not at all Not at all Not at all     BRUNILDA-7 SCORE 11/13/2018 5/7/2019 7/1/2020   Total Score - - -   Total Score - - -   Total Score 0 2 0           Suicide Assessment Five-step Evaluation and Treatment (SAFE-T)      How many servings of fruits and vegetables do you eat daily?  2    On average, how many sweetened beverages do you drink each day (Examples: soda, juice, sweet tea, etc.  Do NOT count diet or artificially sweetened beverages)?   0    How many days per week do you exercise enough to make your heart beat faster? 2-3 -walks    How many minutes a day do you exercise enough to make your heart beat faster? 10 - 19    How many days per week do you miss taking your medication? 0         Video Start Time: 1:14 PM        Patient Active Problem List   Diagnosis     Anxiety     Major depressive disorder, single episode, moderate (H)     Acquired hypothyroidism     Past Surgical History:   Procedure Laterality Date     COLONOSCOPY N/A 1/27/2017    NL, repeat 10 years     COLONOSCOPY N/A 1/30/2017    Procedure: COLONOSCOPY;  Surgeon: Dahiana Osuna MD;  Location:  GI     SURGICAL HISTORY OF -   2005    Exc. benign lesion left lower eyelid       Social History     Tobacco Use     Smoking status: Never Smoker     Smokeless tobacco: Never Used   Substance Use Topics     Alcohol use: Yes     Alcohol/week: 0.0 - 2.0 standard drinks     Comment: 2-3 drinks per mth     Family History   Problem Relation Age of Onset     Lipids Father      Psychotic Disorder Maternal Grandmother         depression     Diabetes Paternal Grandmother          Current Outpatient Medications   Medication Sig Dispense Refill     Digestive Enzymes CAPS        fish oil-omega-3 fatty acids 1000 MG capsule Take 2 g by mouth daily       levothyroxine (SYNTHROID/LEVOTHROID) 75 MCG tablet TAKE 1 TABLET BY MOUTH EVERY DAY 90 tablet 3     nortriptyline (PAMELOR) 50 MG  capsule TAKE TWO CAPSULES BY MOUTH DAILY AT BEDTIME 60 capsule 0     Probiotic Product (PROBIOTIC-10 PO)        propranolol ER (INDERAL LA) 60 MG 24 hr capsule Take 1 capsule (60 mg) by mouth daily 90 capsule 1     venlafaxine (EFFEXOR-ER) 150 MG 24 hr tablet Take 1 tablet (150 mg) by mouth daily Take 150 mg every day along with 75 mg QD 90 tablet 1     venlafaxine (EFFEXOR-ER) 75 MG 24 hr tablet Take 1 tablet (75 mg) by mouth daily 90 each 0     Allergies   Allergen Reactions     Bupropion Itching     Tobramycin Visual Disturbance     used for pink eye and eyes became more irritated, burning and itchy       Reviewed and updated as needed this visit by Provider         Review of Systems   CONSTITUTIONAL: NEGATIVE for fever, chills, change in weight  ENT/MOUTH: NEGATIVE for ear, mouth and throat problems  RESP: NEGATIVE for significant cough or SOB  CV: NEGATIVE for chest pain, palpitations or peripheral edema      Objective             Physical Exam     GENERAL: Healthy, alert and no distress  EYES: Eyes grossly normal to inspection.  No discharge or erythema, or obvious scleral/conjunctival abnormalities.  RESP: No audible wheeze, cough, or visible cyanosis.  No visible retractions or increased work of breathing.    SKIN: Visible skin clear. No significant rash, abnormal pigmentation or lesions.  NEURO: Cranial nerves grossly intact.  Mentation and speech appropriate for age.  PSYCH: Mentation appears normal, affect normal/bright, judgement and insight intact, normal speech and appearance well-groomed.              Assessment & Plan     1. Anxiety  Symptoms are well controlled.  Recommending to resume propanolol 60 mg capsule daily.  Along with Effexor.  - propranolol ER (INDERAL LA) 60 MG 24 hr capsule; Take 1 capsule (60 mg) by mouth daily  Dispense: 90 capsule; Refill: 1    2. Major depressive disorder, single episode, moderate (H)  Recommending to decrease the dose of nortriptyline from 100 mg to 50 mg at  "bedtime.  I have asked her to try this for a good 2 weeks.  If she has trouble with insomnia or any other symptoms because of the dose adjustment, at the end of 2 weeks she can go back to 100 mg dose and notify me back.    If all goes well she will be on 50 mg of nortriptyline and in about 3 to 4 weeks she can stop taking 75 mg of Effexor and continue the 150 mg of Effexor.  I have asked her to try that for 2 weeks at least before deciding if that is the right dose for her or not.  She is asked to contact me through my chart if any questions arise.  Patient verbalized understanding     - venlafaxine (EFFEXOR-ER) 150 MG 24 hr tablet; Take 1 tablet (150 mg) by mouth daily Take 150 mg every day along with 75 mg QD  Dispense: 90 tablet; Refill: 1  - venlafaxine (EFFEXOR-ER) 75 MG 24 hr tablet; Take 1 tablet (75 mg) by mouth daily  Dispense: 90 each; Refill: 0    3. Encounter for screening mammogram for breast cancer    - MA Screen Bilateral w/Ridge; Future     BMI:   Estimated body mass index is 32.89 kg/m  as calculated from the following:    Height as of 12/4/19: 1.638 m (5' 4.5\").    Weight as of 12/4/19: 88.3 kg (194 lb 9.6 oz).               Return in about 3 months (around 10/1/2020) for Annual Physical Exam.    Lou Ricardo MD  [facility] [place]      Video-Visit Details    Type of service:  Video Visit    Video End Time:1:25 PM    Originating Location (pt. Location): Home    Distant Location (provider location):  [facility] [place]     Platform used for Video Visit: Desiree    Return in about 3 months (around 10/1/2020) for Annual Physical Exam.       Lou Ricardo MD        "

## 2020-07-01 ENCOUNTER — VIRTUAL VISIT (OUTPATIENT)
Dept: FAMILY MEDICINE | Facility: CLINIC | Age: 55
End: 2020-07-01
Payer: COMMERCIAL

## 2020-07-01 DIAGNOSIS — Z12.31 ENCOUNTER FOR SCREENING MAMMOGRAM FOR BREAST CANCER: Primary | ICD-10-CM

## 2020-07-01 DIAGNOSIS — F41.9 ANXIETY: ICD-10-CM

## 2020-07-01 DIAGNOSIS — F32.1 MAJOR DEPRESSIVE DISORDER, SINGLE EPISODE, MODERATE (H): ICD-10-CM

## 2020-07-01 PROCEDURE — 99213 OFFICE O/P EST LOW 20 MIN: CPT | Mod: 95 | Performed by: FAMILY MEDICINE

## 2020-07-01 RX ORDER — VENLAFAXINE HYDROCHLORIDE 150 MG/1
150 TABLET, EXTENDED RELEASE ORAL DAILY
Qty: 90 TABLET | Refills: 1 | Status: SHIPPED | OUTPATIENT
Start: 2020-07-01 | End: 2021-01-13

## 2020-07-01 RX ORDER — VENLAFAXINE HYDROCHLORIDE 75 MG/1
75 TABLET, EXTENDED RELEASE ORAL DAILY
Qty: 90 EACH | Refills: 0 | Status: SHIPPED | OUTPATIENT
Start: 2020-07-01 | End: 2021-03-26

## 2020-07-01 RX ORDER — PROPRANOLOL HCL 60 MG
60 CAPSULE, EXTENDED RELEASE 24HR ORAL DAILY
Qty: 90 CAPSULE | Refills: 1 | Status: SHIPPED | OUTPATIENT
Start: 2020-07-01 | End: 2021-02-16

## 2020-07-01 RX ORDER — NIACINAMIDE 500 MG
TABLET, EXTENDED RELEASE ORAL
COMMUNITY
End: 2023-05-01

## 2020-07-01 RX ORDER — CHLORAL HYDRATE 500 MG
2 CAPSULE ORAL DAILY
COMMUNITY
End: 2023-05-01

## 2020-07-01 ASSESSMENT — PATIENT HEALTH QUESTIONNAIRE - PHQ9
5. POOR APPETITE OR OVEREATING: NOT AT ALL
SUM OF ALL RESPONSES TO PHQ QUESTIONS 1-9: 0

## 2020-07-01 ASSESSMENT — ANXIETY QUESTIONNAIRES
1. FEELING NERVOUS, ANXIOUS, OR ON EDGE: NOT AT ALL
7. FEELING AFRAID AS IF SOMETHING AWFUL MIGHT HAPPEN: NOT AT ALL
5. BEING SO RESTLESS THAT IT IS HARD TO SIT STILL: NOT AT ALL
GAD7 TOTAL SCORE: 0
2. NOT BEING ABLE TO STOP OR CONTROL WORRYING: NOT AT ALL
3. WORRYING TOO MUCH ABOUT DIFFERENT THINGS: NOT AT ALL
6. BECOMING EASILY ANNOYED OR IRRITABLE: NOT AT ALL

## 2020-07-02 ASSESSMENT — ANXIETY QUESTIONNAIRES: GAD7 TOTAL SCORE: 0

## 2020-07-09 DIAGNOSIS — F32.1 MAJOR DEPRESSIVE DISORDER, SINGLE EPISODE, MODERATE (H): ICD-10-CM

## 2020-07-09 RX ORDER — VENLAFAXINE HYDROCHLORIDE 75 MG/1
CAPSULE, EXTENDED RELEASE ORAL
Qty: 30 CAPSULE | Refills: 5 | OUTPATIENT
Start: 2020-07-09

## 2020-07-09 RX ORDER — VENLAFAXINE HYDROCHLORIDE 150 MG/1
CAPSULE, EXTENDED RELEASE ORAL
Qty: 30 CAPSULE | Refills: 5 | OUTPATIENT
Start: 2020-07-09

## 2020-08-02 DIAGNOSIS — F32.1 MAJOR DEPRESSIVE DISORDER, SINGLE EPISODE, MODERATE (H): ICD-10-CM

## 2020-08-03 RX ORDER — NORTRIPTYLINE HYDROCHLORIDE 50 MG/1
CAPSULE ORAL
Qty: 60 CAPSULE | Refills: 1 | OUTPATIENT
Start: 2020-08-03

## 2020-09-25 DIAGNOSIS — F32.1 MAJOR DEPRESSIVE DISORDER, SINGLE EPISODE, MODERATE (H): ICD-10-CM

## 2020-09-25 RX ORDER — NORTRIPTYLINE HYDROCHLORIDE 50 MG/1
CAPSULE ORAL
Qty: 60 CAPSULE | Refills: 1 | Status: SHIPPED | OUTPATIENT
Start: 2020-09-25 | End: 2020-10-28

## 2020-09-30 DIAGNOSIS — Z12.31 ENCOUNTER FOR SCREENING MAMMOGRAM FOR BREAST CANCER: Primary | ICD-10-CM

## 2020-10-27 DIAGNOSIS — F32.1 MAJOR DEPRESSIVE DISORDER, SINGLE EPISODE, MODERATE (H): ICD-10-CM

## 2020-10-27 NOTE — TELEPHONE ENCOUNTER
"Failed protocol    Requested Prescriptions   Pending Prescriptions Disp Refills     nortriptyline (PAMELOR) 50 MG capsule [Pharmacy Med Name: NORTRIPTYLINE HCL 50 MG CAP] 60 capsule 1     Sig: TAKE TWO CAPSULES BY MOUTH DAILY AT BEDTIME       Tricyclic Agents ( Annual appt and no PHQ9) Passed - 10/27/2020 11:07 AM        Passed - Blood Pressure under 140/90 in past 12 mos     BP Readings from Last 3 Encounters:   12/04/19 122/82   05/07/19 116/80   02/05/19 120/80                 Passed - Recent (12 mo) or future (30 days) visit within authorizing provider's specialty     Patient has had an office visit with the authorizing provider or a provider within the authorizing providers department within the previous 12 mos or has a future within next 30 days. See \"Patient Info\" tab in inbasket, or \"Choose Columns\" in Meds & Orders section of the refill encounter.              Passed - Medication is active on med list        Passed - Patient is age 18 or older        Passed - Patient is not pregnant        Passed - No positive pregnancy test on record in past 12 mos           venlafaxine (EFFEXOR-XR) 75 MG 24 hr capsule [Pharmacy Med Name: VENLAFAXINE HCL ER 75 MG CAP] 30 capsule 5     Sig: TAKE 1 TABLET BY MOUTH EVERY DAY       Serotonin-Norepinephrine Reuptake Inhibitors  Failed - 10/27/2020 11:07 AM        Failed - PHQ-9 score of less than 5 in past 6 months     Please review last PHQ-9 score.           Failed - Normal serum creatinine on file in past 12 months     Recent Labs   Lab Test 05/01/18  0902   CR 0.84       Ok to refill medication if creatinine is low          Passed - Blood pressure under 140/90 in past 12 months     BP Readings from Last 3 Encounters:   12/04/19 122/82   05/07/19 116/80   02/05/19 120/80                 Passed - Medication is active on med list        Passed - Patient is age 18 or older        Passed - No active pregnancy on record        Passed - No positive pregnancy test in past 12 months " "       Passed - Recent (6 mo) or future (30 days) visit within the authorizing provider's specialty     Patient had office visit in the last 6 months or has a visit in the next 30 days with authorizing provider or within the authorizing provider's specialty.  See \"Patient Info\" tab in inbasket, or \"Choose Columns\" in Meds & Orders section of the refill encounter.               venlafaxine (EFFEXOR-XR) 150 MG 24 hr capsule [Pharmacy Med Name: VENLAFAXINE HCL  MG CAP] 30 capsule 5     Sig: TAKE 1 CAPSULE BY MOUTH EVERY DAY ALONG WITH 75 MG       Serotonin-Norepinephrine Reuptake Inhibitors  Failed - 10/27/2020 11:07 AM        Failed - PHQ-9 score of less than 5 in past 6 months     Please review last PHQ-9 score.           Failed - Normal serum creatinine on file in past 12 months     Recent Labs   Lab Test 05/01/18  0902   CR 0.84       Ok to refill medication if creatinine is low          Passed - Blood pressure under 140/90 in past 12 months     BP Readings from Last 3 Encounters:   12/04/19 122/82   05/07/19 116/80   02/05/19 120/80                 Passed - Medication is active on med list        Passed - Patient is age 18 or older        Passed - No active pregnancy on record        Passed - No positive pregnancy test in past 12 months        Passed - Recent (6 mo) or future (30 days) visit within the authorizing provider's specialty     Patient had office visit in the last 6 months or has a visit in the next 30 days with authorizing provider or within the authorizing provider's specialty.  See \"Patient Info\" tab in inbasket, or \"Choose Columns\" in Meds & Orders section of the refill encounter.               venlafaxine (EFFEXOR-ER) 75 MG 24 hr tablet [Pharmacy Med Name: VENLAFAXINE HCL ER 75 MG TAB] 30 tablet 2     Sig: TAKE 1 TABLET BY MOUTH EVERY DAY       Serotonin-Norepinephrine Reuptake Inhibitors  Failed - 10/27/2020 11:07 AM        Failed - PHQ-9 score of less than 5 in past 6 months     Please " "review last PHQ-9 score.           Failed - Normal serum creatinine on file in past 12 months     Recent Labs   Lab Test 05/01/18  0902   CR 0.84       Ok to refill medication if creatinine is low          Passed - Blood pressure under 140/90 in past 12 months     BP Readings from Last 3 Encounters:   12/04/19 122/82   05/07/19 116/80   02/05/19 120/80                 Passed - Medication is active on med list        Passed - Patient is age 18 or older        Passed - No active pregnancy on record        Passed - No positive pregnancy test in past 12 months        Passed - Recent (6 mo) or future (30 days) visit within the authorizing provider's specialty     Patient had office visit in the last 6 months or has a visit in the next 30 days with authorizing provider or within the authorizing provider's specialty.  See \"Patient Info\" tab in inbasket, or \"Choose Columns\" in Meds & Orders section of the refill encounter.                 "

## 2020-10-27 NOTE — LETTER
November 4, 2020      Suni Mckeon  [id number]  [place] [address]        Dear Suni,    I care about your health and have reviewed your health plan. I have reviewed your medical conditions, medication list, and lab results and am making recommendations based on this review, to better manage your health.    You are in particular need of attention regarding:  -Breast Cancer Screening  -Wellness (Physical) Visit   -Medications    I am recommending that you:  -Schedule an appointment    Here is a list of Health Maintenance topics that are due now or due soon:  Health Maintenance Due   Topic Date Due     Discuss Advance Care Planning  1965     Preventive Care Visit  05/07/2020     Mammogram  05/07/2020     Flu Vaccine (1) 09/01/2020     Zoster (Shingles) Vaccine (2 of 2) 01/29/2020     Thyroid Function Lab  12/04/2020       Please call us at [phone] (or use [employer]) to address the above recommendations.     Thank you for trusting [facility] and we appreciate the opportunity to serve you.  We look forward to supporting your healthcare needs in the future.    Healthy Regards,    Lou Ricardo MD

## 2020-10-28 RX ORDER — NORTRIPTYLINE HYDROCHLORIDE 50 MG/1
CAPSULE ORAL
Qty: 60 CAPSULE | Refills: 5 | Status: SHIPPED | OUTPATIENT
Start: 2020-10-28 | End: 2021-04-13

## 2020-10-28 RX ORDER — VENLAFAXINE HYDROCHLORIDE 150 MG/1
CAPSULE, EXTENDED RELEASE ORAL
Qty: 30 CAPSULE | Refills: 5 | Status: SHIPPED | OUTPATIENT
Start: 2020-10-28 | End: 2021-04-13

## 2020-10-28 RX ORDER — VENLAFAXINE HYDROCHLORIDE 75 MG/1
CAPSULE, EXTENDED RELEASE ORAL
Qty: 30 CAPSULE | Refills: 5 | Status: SHIPPED | OUTPATIENT
Start: 2020-10-28 | End: 2021-04-13

## 2020-10-28 RX ORDER — VENLAFAXINE HYDROCHLORIDE 75 MG/1
TABLET, EXTENDED RELEASE ORAL
Qty: 30 TABLET | Refills: 2 | OUTPATIENT
Start: 2020-10-28

## 2020-10-28 NOTE — TELEPHONE ENCOUNTER
Medications refilled.  Please have the patient get her mammogram scheduled.  It was ordered last month.    Lou Ricardo MD  [facility] [place]

## 2020-10-28 NOTE — TELEPHONE ENCOUNTER
[employer] message sent to patient.    .Meche MILLER    [facility] [place]

## 2020-11-02 NOTE — TELEPHONE ENCOUNTER
Patient has seen message and will follow up in a couple days.    .Meche MILLER    [facility] [place]

## 2020-11-04 NOTE — TELEPHONE ENCOUNTER
Letter sent.    .Meche MILLER    [facility] [place]

## 2021-01-13 DIAGNOSIS — F32.1 MAJOR DEPRESSIVE DISORDER, SINGLE EPISODE, MODERATE (H): ICD-10-CM

## 2021-01-13 RX ORDER — VENLAFAXINE HYDROCHLORIDE 150 MG/1
TABLET, EXTENDED RELEASE ORAL
Qty: 30 TABLET | Refills: 1 | Status: SHIPPED | OUTPATIENT
Start: 2021-01-13 | End: 2021-04-13

## 2021-01-13 NOTE — TELEPHONE ENCOUNTER
Routing refill request to provider for review/approval because:  Labs not current:  Cr  Patient needs to be seen because:  Due for 6 month follow-up  PHQ-9 not current  BP not current    Duplicate medications on med list - please clarify.

## 2021-01-13 NOTE — TELEPHONE ENCOUNTER
Patient is overdue for preventative care including mammogram.  Please have her schedule her preventative visit with me in clinic.  Come in fasting.  Medication refilled.    Lou Ricardo MD  [facility], [place]

## 2021-01-15 ENCOUNTER — HEALTH MAINTENANCE LETTER (OUTPATIENT)
Age: 56
End: 2021-01-15

## 2021-01-21 DIAGNOSIS — F32.1 MAJOR DEPRESSIVE DISORDER, SINGLE EPISODE, MODERATE (H): ICD-10-CM

## 2021-01-22 RX ORDER — VENLAFAXINE HYDROCHLORIDE 75 MG/1
TABLET, EXTENDED RELEASE ORAL
Qty: 30 TABLET | Refills: 2 | OUTPATIENT
Start: 2021-01-22

## 2021-01-22 NOTE — TELEPHONE ENCOUNTER
Duplicate- sent info sent to pharmacy.  Anaya CHEN RN  [facility]  [phone]

## 2021-01-27 ENCOUNTER — TELEPHONE (OUTPATIENT)
Dept: FAMILY MEDICINE | Facility: CLINIC | Age: 56
End: 2021-01-27

## 2021-01-27 NOTE — TELEPHONE ENCOUNTER
Needs of attention regarding:  -Breast Cancer Screening    Health Maintenance Topics with due status: Overdue       Topic Date Due    ADVANCE CARE PLANNING 1965    PREVENTIVE CARE VISIT 05/07/2020    MAMMO SCREENING 05/07/2020    INFLUENZA VACCINE 09/01/2020    TSH W/FREE T4 REFLEX 12/04/2020    PHQ-9 01/01/2021     Health Maintenance Topics with due status: Due On       Topic Date Due    ZOSTER IMMUNIZATION 01/29/2020       Communication:  See MyChart message

## 2021-02-13 DIAGNOSIS — F41.9 ANXIETY: ICD-10-CM

## 2021-02-13 NOTE — LETTER
February 22, 2021      Suni Mckeon  [address]  [place] [address]        Dear Suni,    I care about your health and have reviewed your health plan. I have reviewed your medical conditions, medication list, and lab results and am making recommendations based on this review, to better manage your health.    You are in particular need of attention regarding:  -Wellness (Physical) Visit   -Labs    I am recommending that you:  -schedule a physical and labs    Here is a list of Health Maintenance topics that are due now or due soon:  Health Maintenance Due   Topic Date Due     Discuss Advance Care Planning  1965     Zoster (Shingles) Vaccine (2 of 2) 01/29/2020     Preventive Care Visit  05/07/2020     Mammogram  05/07/2020     Flu Vaccine (1) 09/01/2020     Thyroid Function Lab  12/04/2020     Depression Assessment  01/01/2021       Please call us at [phone] (or use [email or web address]) to address the above recommendations.     Thank you for trusting [facility] and we appreciate the opportunity to serve you.  We look forward to supporting your healthcare needs in the future.    Healthy Regards,    Lou Ricardo MD

## 2021-02-14 NOTE — TELEPHONE ENCOUNTER
Failed protocol.  please route to  team if patient needs an appointment     Anaya CHENRN BSN  [facility]  [phone]

## 2021-02-16 RX ORDER — PROPRANOLOL HCL 60 MG
CAPSULE, EXTENDED RELEASE 24HR ORAL
Qty: 30 CAPSULE | Refills: 0 | Status: SHIPPED | OUTPATIENT
Start: 2021-02-16 | End: 2021-03-18

## 2021-02-16 NOTE — TELEPHONE ENCOUNTER
Medication refill ordered for a month.  Please have the patient schedule a preventative care visit due for a mammogram which was ordered previously and she never got it scheduled.  Please have her schedule with that as well.  Come in fasting for blood work.  Her last set of blood work was done in 2018.    Also send her PHQ-9 and BRUNILDA-7 to be filled out on my chart.    Lou Ricardo MD  [facility], [place]

## 2021-02-16 NOTE — TELEPHONE ENCOUNTER
[employer] message and questionnaires have been sent.    .Meche MILLER    [facility] [place]

## 2021-02-18 NOTE — TELEPHONE ENCOUNTER
Pt has not read [employer] message, Left message on voicemail for patient to call back. Tez FELIZ CMA

## 2021-02-22 NOTE — TELEPHONE ENCOUNTER
Meche Call contacted Suni on 02/22/21 and left a message. If patient calls back please schedule appointment as soon as possible. Needs to schedule a medication recheck. And fasting labs. Letter sent.    .Meche MILLER    [facility] [place]

## 2021-02-24 DIAGNOSIS — E03.9 ACQUIRED HYPOTHYROIDISM: ICD-10-CM

## 2021-02-24 RX ORDER — LEVOTHYROXINE SODIUM 75 UG/1
TABLET ORAL
Qty: 30 TABLET | Refills: 0 | Status: SHIPPED | OUTPATIENT
Start: 2021-02-24 | End: 2021-03-26

## 2021-02-24 NOTE — TELEPHONE ENCOUNTER
VM left for patient to call back.    .Meche MILLER    [facility] [place]

## 2021-02-24 NOTE — TELEPHONE ENCOUNTER
Refill ordered for 30 days.  Patient is due for thyroid recheck. Please have her schedule a in person or virtual for that.. She will need to come in for labs after the visit.     She is also due for preventative visit.   Remind her to schedule mammogram, as she is overdue for that.   Also send her PHQ9 via [employer].    Thanks    Lou Ricardo MD  [facility], [place]

## 2021-02-24 NOTE — LETTER
March 9, 2021      Suni Mckeon  [address] [address]        Dear Suni,    I care about your health and have reviewed your health plan. I have reviewed your medical conditions, medication list, and lab results and am making recommendations based on this review, to better manage your health.    You are in particular need of attention regarding:  -Depression  -Breast Cancer Screening  -Wellness (Physical) Visit   -Lab work    I am recommending that you:  -schedule a WELLNESS (Physical) APPOINTMENT with me.      -schedule a MAMMOGRAM which is due. We have a mobile mammogram unit that comes to [facility]  cl[facility].To schedule please call the clinic at [phone]. Or, you can call [facility] at [phone] to schedule this.  Please disregard this reminder if you have had this exam elsewhere within the last year.  It would be helpful for us to have a copy of your mammogram report in our file so that we can best coordinate your care.    Here is a list of Health Maintenance topics that are due now or due soon:  Health Maintenance Due   Topic Date Due     Discuss Advance Care Planning  Never done     Zoster (Shingles) Vaccine (2 of 2) 01/29/2020     Preventive Care Visit  05/07/2020     Mammogram  05/07/2020     Flu Vaccine (1) 09/01/2020     Thyroid Function Lab  12/04/2020     Depression Assessment  01/01/2021           Please call us at [phone] (or use [email or web address]) to address the above recommendations.     Thank you for trusting [facility] and we appreciate the opportunity to serve you.  We look forward to supporting your healthcare needs in the future.    Healthy Regards,    Lou Ricardo MD

## 2021-02-24 NOTE — TELEPHONE ENCOUNTER
Routing refill request to provider for review/approval because:  Labs not current:    TSH   Date Value Ref Range Status   12/04/2019 2.93 0.40 - 4.00 mU/L Final           Lucia Farmer RN

## 2021-02-26 NOTE — TELEPHONE ENCOUNTER
[employer] message sent to patient.    .Meche MILLER    [facility] [place]

## 2021-03-18 DIAGNOSIS — F41.9 ANXIETY: ICD-10-CM

## 2021-03-18 RX ORDER — PROPRANOLOL HCL 60 MG
CAPSULE, EXTENDED RELEASE 24HR ORAL
Qty: 30 CAPSULE | Refills: 0 | Status: SHIPPED | OUTPATIENT
Start: 2021-03-18 | End: 2021-04-13

## 2021-03-18 NOTE — TELEPHONE ENCOUNTER
Routing refill request to provider for review/approval because:  Failed BP protocol with no bp since 2019.    Shyann INFANTE RN  EP Triage

## 2021-03-18 NOTE — TELEPHONE ENCOUNTER
RF approved. Patient needs a follow up appointment for further refills.     Lou Ricardo MD  [facility], [place]

## 2021-04-13 ENCOUNTER — OFFICE VISIT (OUTPATIENT)
Dept: FAMILY MEDICINE | Facility: CLINIC | Age: 56
End: 2021-04-13
Payer: COMMERCIAL

## 2021-04-13 VITALS
DIASTOLIC BLOOD PRESSURE: 60 MMHG | HEART RATE: 75 BPM | TEMPERATURE: 97.3 F | WEIGHT: 160 LBS | SYSTOLIC BLOOD PRESSURE: 112 MMHG | BODY MASS INDEX: 27.31 KG/M2 | HEIGHT: 64 IN | OXYGEN SATURATION: 99 %

## 2021-04-13 DIAGNOSIS — F32.1 MAJOR DEPRESSIVE DISORDER, SINGLE EPISODE, MODERATE (H): ICD-10-CM

## 2021-04-13 DIAGNOSIS — Z13.6 CARDIOVASCULAR SCREENING; LDL GOAL LESS THAN 160: ICD-10-CM

## 2021-04-13 DIAGNOSIS — F41.9 ANXIETY: ICD-10-CM

## 2021-04-13 DIAGNOSIS — Z12.31 ENCOUNTER FOR SCREENING MAMMOGRAM FOR BREAST CANCER: ICD-10-CM

## 2021-04-13 DIAGNOSIS — E03.9 ACQUIRED HYPOTHYROIDISM: ICD-10-CM

## 2021-04-13 DIAGNOSIS — Z00.00 ENCOUNTER FOR ROUTINE ADULT HEALTH EXAMINATION WITHOUT ABNORMAL FINDINGS: Primary | ICD-10-CM

## 2021-04-13 LAB
ANION GAP SERPL CALCULATED.3IONS-SCNC: 1 MMOL/L (ref 3–14)
BUN SERPL-MCNC: 21 MG/DL (ref 7–30)
CALCIUM SERPL-MCNC: 9.1 MG/DL (ref 8.5–10.1)
CHLORIDE SERPL-SCNC: 103 MMOL/L (ref 94–109)
CHOLEST SERPL-MCNC: 245 MG/DL
CO2 SERPL-SCNC: 32 MMOL/L (ref 20–32)
CREAT SERPL-MCNC: 0.9 MG/DL (ref 0.52–1.04)
GFR SERPL CREATININE-BSD FRML MDRD: 72 ML/MIN/{1.73_M2}
GLUCOSE SERPL-MCNC: 90 MG/DL (ref 70–99)
HDLC SERPL-MCNC: 58 MG/DL
LDLC SERPL CALC-MCNC: 172 MG/DL
NONHDLC SERPL-MCNC: 187 MG/DL
POTASSIUM SERPL-SCNC: 4.2 MMOL/L (ref 3.4–5.3)
SODIUM SERPL-SCNC: 136 MMOL/L (ref 133–144)
T4 FREE SERPL-MCNC: 1.06 NG/DL (ref 0.76–1.46)
TRIGL SERPL-MCNC: 75 MG/DL
TSH SERPL DL<=0.005 MIU/L-ACNC: 6.4 MU/L (ref 0.4–4)

## 2021-04-13 PROCEDURE — 84443 ASSAY THYROID STIM HORMONE: CPT | Performed by: PHYSICIAN ASSISTANT

## 2021-04-13 PROCEDURE — 36415 COLL VENOUS BLD VENIPUNCTURE: CPT | Performed by: PHYSICIAN ASSISTANT

## 2021-04-13 PROCEDURE — 99396 PREV VISIT EST AGE 40-64: CPT | Performed by: PHYSICIAN ASSISTANT

## 2021-04-13 PROCEDURE — 80061 LIPID PANEL: CPT | Performed by: PHYSICIAN ASSISTANT

## 2021-04-13 PROCEDURE — 80048 BASIC METABOLIC PNL TOTAL CA: CPT | Performed by: PHYSICIAN ASSISTANT

## 2021-04-13 PROCEDURE — 84439 ASSAY OF FREE THYROXINE: CPT | Performed by: PHYSICIAN ASSISTANT

## 2021-04-13 RX ORDER — PROPRANOLOL HCL 60 MG
CAPSULE, EXTENDED RELEASE 24HR ORAL
Qty: 90 CAPSULE | Refills: 1 | Status: SHIPPED | OUTPATIENT
Start: 2021-04-13 | End: 2021-10-14

## 2021-04-13 RX ORDER — VENLAFAXINE HYDROCHLORIDE 75 MG/1
75 TABLET, EXTENDED RELEASE ORAL DAILY
Qty: 90 TABLET | Refills: 1 | Status: SHIPPED | OUTPATIENT
Start: 2021-04-13 | End: 2021-06-17

## 2021-04-13 RX ORDER — NORTRIPTYLINE HYDROCHLORIDE 50 MG/1
CAPSULE ORAL
Qty: 180 CAPSULE | Refills: 1 | Status: SHIPPED | OUTPATIENT
Start: 2021-04-13 | End: 2021-11-23

## 2021-04-13 RX ORDER — VENLAFAXINE HYDROCHLORIDE 150 MG/1
CAPSULE, EXTENDED RELEASE ORAL
Qty: 90 CAPSULE | Refills: 1 | Status: SHIPPED | OUTPATIENT
Start: 2021-04-13 | End: 2022-05-20

## 2021-04-13 ASSESSMENT — MIFFLIN-ST. JEOR: SCORE: 1300.76

## 2021-04-13 NOTE — PROGRESS NOTES
error    Answers for HPI/ROS submitted by the patient on 4/10/2021   Annual Exam:  Frequency of exercise:: 1 day/week  Getting at least 3 servings of Calcium per day:: Yes  Diet:: Other  Taking medications regularly:: Yes  Medication side effects:: None  Bi-annual eye exam:: Yes  Dental care twice a year:: Yes  Sleep apnea or symptoms of sleep apnea:: None  Additional concerns today:: No  Duration of exercise:: Less than 15 minutes

## 2021-04-13 NOTE — PROGRESS NOTES
SUBJECTIVE:   CC: Suni Mckeon is an 55 year old woman who presents for preventive health visit.       Patient has been advised of split billing requirements and indicates understanding: Yes  Healthy Habits:     Getting at least 3 servings of Calcium per day:  Yes    Bi-annual eye exam:  Yes    Dental care twice a year:  Yes    Sleep apnea or symptoms of sleep apnea:  None    Diet:  Other    Frequency of exercise:  1 day/week    Duration of exercise:  Less than 15 minutes    Taking medications regularly:  Yes    Medication side effects:  None    PHQ-2 Total Score: 0    Additional concerns today:  No    Suni presents to the clinic for annual exam.  She is feeling well recently, feels like her Effexor is helping with her anxiety and depression. No concerns today    Today's PHQ-2 Score:   PHQ-2 ( 1999 Pfizer) 4/10/2021   Q1: Little interest or pleasure in doing things 0   Q2: Feeling down, depressed or hopeless 0   PHQ-2 Score 0   Q1: Little interest or pleasure in doing things Not at all   Q2: Feeling down, depressed or hopeless Not at all   PHQ-2 Score 0       Abuse: Current or Past (Physical, Sexual or Emotional) - No  Do you feel safe in your environment? Yes    Have you ever done Advance Care Planning? (For example, a Health Directive, POLST, or a discussion with a medical provider or your loved ones about your wishes): No, advance care planning information given to patient to review.  Patient plans to discuss their wishes with loved ones or provider.      Social History     Tobacco Use     Smoking status: Never Smoker     Smokeless tobacco: Never Used   Substance Use Topics     Alcohol use: Yes     Alcohol/week: 0.0 - 2.0 standard drinks     Comment: 2-3 drinks per mth       Alcohol Use 4/10/2021   Prescreen: >3 drinks/day or >7 drinks/week? No   Prescreen: >3 drinks/day or >7 drinks/week? -     Reviewed orders with patient.  Reviewed health maintenance and updated orders accordingly - Yes  Patient  Active Problem List   Diagnosis     Anxiety     Major depressive disorder, single episode, moderate (H)     Acquired hypothyroidism     Past Surgical History:   Procedure Laterality Date     COLONOSCOPY N/A 1/27/2017    NL, repeat 10 years     COLONOSCOPY N/A 1/30/2017    Procedure: COLONOSCOPY;  Surgeon: Dahiana Osuna MD;  Location:  GI     SURGICAL HISTORY OF -   2005    Exc. benign lesion left lower eyelid       Social History     Tobacco Use     Smoking status: Never Smoker     Smokeless tobacco: Never Used   Substance Use Topics     Alcohol use: Yes     Alcohol/week: 0.0 - 2.0 standard drinks     Comment: 2-3 drinks per mth     Family History   Problem Relation Age of Onset     Lipids Father      Psychotic Disorder Maternal Grandmother         depression     Diabetes Paternal Grandmother          Current Outpatient Medications   Medication Sig Dispense Refill     Digestive Enzymes CAPS        fish oil-omega-3 fatty acids 1000 MG capsule Take 2 g by mouth daily       levothyroxine (SYNTHROID/LEVOTHROID) 75 MCG tablet TAKE 1 TABLET BY MOUTH EVERY DAY 30 tablet 0     nortriptyline (PAMELOR) 50 MG capsule Take 2 capsule at night 180 capsule 1     Probiotic Product (PROBIOTIC-10 PO)        propranolol ER (INDERAL LA) 60 MG 24 hr capsule TAKE 1 CAPSULE BY MOUTH EVERY DAY 90 capsule 1     venlafaxine (EFFEXOR-ER) 75 MG 24 hr tablet Take 1 tablet (75 mg) by mouth daily 90 tablet 1     venlafaxine (EFFEXOR-XR) 150 MG 24 hr capsule TAKE 1 CAPSULE BY MOUTH EVERY DAY ALONG WITH 75 MG 90 capsule 1     Allergies   Allergen Reactions     Bupropion Itching     Tobramycin Visual Disturbance     used for pink eye and eyes became more irritated, burning and itchy     Recent Labs   Lab Test 12/04/19  1348 05/07/19  1708 05/01/18  0902 07/09/14  0835 07/09/14  0835   LDL  --   --  121*  --  125   HDL  --   --  50  --  42*   TRIG  --   --  49  --  77   ALT  --   --   --   --  24   CR  --   --  0.84  --  0.72   GFRESTIMATED   --   --  70  --  86   GFRESTBLACK  --   --  85  --  >90   POTASSIUM  --   --  4.1  --  4.0   TSH 2.93 4.91* 3.85   < > 4.40    < > = values in this interval not displayed.        Breast Cancer Screening:    Breast CA Risk Assessment (FHS-7) 4/10/2021   Do you have a family history of breast, colon, or ovarian cancer? No / Unknown       Mammogram Screening: Recommended mammography every 1-2 years with patient discussion and risk factor consideration  Pertinent mammograms are reviewed under the imaging tab.    History of abnormal Pap smear: NO - age 30-65 PAP every 5 years with negative HPV co-testing recommended  PAP / HPV Latest Ref Rng & Units 5/1/2018 7/9/2014 12/9/2010   PAP - NIL NIL NIL   HPV 16 DNA NEG:Negative Negative - -   HPV 18 DNA NEG:Negative Negative - -   OTHER HR HPV NEG:Negative Negative - -     Reviewed and updated as needed this visit by clinical staff  Tobacco  Allergies               Reviewed and updated as needed this visit by Provider                Past Medical History:   Diagnosis Date     Anxiety      Irritable bowel syndrome 1994    constipation     Left knee pain      Moderate major depression (H)      Pyelonephritis, unspecified 2006     Recurrent cold sores     Lysine     Thyroid disease       Past Surgical History:   Procedure Laterality Date     COLONOSCOPY N/A 1/27/2017    NL, repeat 10 years     COLONOSCOPY N/A 1/30/2017    Procedure: COLONOSCOPY;  Surgeon: Dahiana Osuna MD;  Location:  GI     SURGICAL HISTORY OF -   2005    Exc. benign lesion left lower eyelid       Review of Systems  CONSTITUTIONAL: NEGATIVE for fever, chills, change in weight  INTEGUMENTARU/SKIN: NEGATIVE for worrisome rashes, moles or lesions  EYES: NEGATIVE for vision changes or irritation  ENT: NEGATIVE for ear, mouth and throat problems  RESP: NEGATIVE for significant cough or SOB  BREAST: NEGATIVE for masses, tenderness or discharge  CV: NEGATIVE for chest pain, palpitations or peripheral  "edema  GI: NEGATIVE for nausea, abdominal pain, heartburn, or change in bowel habits  : NEGATIVE for unusual urinary or vaginal symptoms. Periods are regular.  MUSCULOSKELETAL: NEGATIVE for significant arthralgias or myalgia  NEURO: NEGATIVE for weakness, dizziness or paresthesias  PSYCHIATRIC: NEGATIVE for changes in mood or affect     OBJECTIVE:   Pulse 75   Temp 97.3  F (36.3  C) (Tympanic)   Ht 1.626 m (5' 4\")   Wt 72.6 kg (160 lb)   LMP 12/13/2016 (LMP Unknown)   SpO2 99%   BMI 27.46 kg/m    Physical Exam  GENERAL: healthy, alert and no distress  EYES: Eyes grossly normal to inspection, PERRL and conjunctivae and sclerae normal  HENT: ear canals and TM's normal, nose and mouth without ulcers or lesions  NECK: no adenopathy, no asymmetry, masses, or scars and thyroid normal to palpation  RESP: lungs clear to auscultation - no rales, rhonchi or wheezes  CV: regular rate and rhythm, normal S1 S2, no S3 or S4, no murmur, click or rub, no peripheral edema and peripheral pulses strong  ABDOMEN: soft, nontender, no hepatosplenomegaly, no masses and bowel sounds normal  MS: no gross musculoskeletal defects noted, no edema  SKIN: no suspicious lesions or rashes  NEURO: Normal strength and tone, mentation intact and speech normal  PSYCH: mentation appears normal, affect normal/bright    Diagnostic Test Results:  none     ASSESSMENT/PLAN:   1. Encounter for routine adult health examination without abnormal findings    2. Acquired hypothyroidism  Will check labs today and assess dosing of levothyroxine  - TSH  - T4, free    3. Anxiety  Stable, well controlled  - propranolol ER (INDERAL LA) 60 MG 24 hr capsule; TAKE 1 CAPSULE BY MOUTH EVERY DAY  Dispense: 90 capsule; Refill: 1    4. Major depressive disorder, single episode, moderate (H)  Stable, well controlled  - venlafaxine (EFFEXOR-ER) 75 MG 24 hr tablet; Take 1 tablet (75 mg) by mouth daily  Dispense: 90 tablet; Refill: 1  - venlafaxine (EFFEXOR-XR) 150 MG 24 " "hr capsule; TAKE 1 CAPSULE BY MOUTH EVERY DAY ALONG WITH 75 MG  Dispense: 90 capsule; Refill: 1  - nortriptyline (PAMELOR) 50 MG capsule; Take 2 capsule at night  Dispense: 180 capsule; Refill: 1    5. CARDIOVASCULAR SCREENING; LDL GOAL LESS THAN 160  - Lipid Profile (Chol, Trig, HDL, LDL calc)  - Basic metabolic panel  (Ca, Cl, CO2, Creat, Gluc, K, Na, BUN)    6. Encounter for screening mammogram for breast cancer  - *MA Screening Digital Bilateral; Future      COUNSELING:  Reviewed preventive health counseling, as reflected in patient instructions       Regular exercise       Healthy diet/nutrition    Estimated body mass index is 27.46 kg/m  as calculated from the following:    Height as of this encounter: 1.626 m (5' 4\").    Weight as of this encounter: 72.6 kg (160 lb).        She reports that she has never smoked. She has never used smokeless tobacco.      Counseling Resources:  ATP IV Guidelines  Pooled Cohorts Equation Calculator  Breast Cancer Risk Calculator  BRCA-Related Cancer Risk Assessment: FHS-7 Tool  FRAX Risk Assessment  ICSI Preventive Guidelines  Dietary Guidelines for Americans, 2010  USDA's MyPlate  ASA Prophylaxis  Lung CA Screening    MARTIN Reyes [facility]  "

## 2021-04-15 DIAGNOSIS — E03.9 HYPOTHYROIDISM, UNSPECIFIED TYPE: Primary | ICD-10-CM

## 2021-04-15 RX ORDER — LEVOTHYROXINE SODIUM 88 UG/1
88 TABLET ORAL DAILY
Qty: 90 TABLET | Refills: 3 | Status: SHIPPED | OUTPATIENT
Start: 2021-04-15 | End: 2022-04-07

## 2021-04-15 NOTE — RESULT ENCOUNTER NOTE
Suni-  Here are your recent results.       -LDL(bad) cholesterol level is elevated which can increase your heart disease risk.  A diet high in fat and simple carbohydrates can contribute to this. ADVISE: exercising 150 minutes of aerobic exercise per week (30 minutes for 5 days per week or 50 minutes for 3 days per week are options) and eating a low saturated fat/low carbohydrate diet are helpful to improve this.  -Liver and gallbladder tests are normal (ALT,AST, Alk phos, bilirubin), kidney function is normal (Cr, GFR), sodium is normal, potassium is normal, calcium is normal, glucose is normal.  -TSH (thyroid stimulating hormone) is abnormal suggesting you are currently underreplaced.  ADVISE: changing your medication dose to 88 micrograms/day (a prescription has been sent to your pharmacy) and rechecking your TSH with a lab appointment in 6-8 weeks.    It was nice to see you this week!  Edis

## 2021-05-06 DIAGNOSIS — F32.1 MAJOR DEPRESSIVE DISORDER, SINGLE EPISODE, MODERATE (H): ICD-10-CM

## 2021-05-06 RX ORDER — VENLAFAXINE HYDROCHLORIDE 150 MG/1
TABLET, EXTENDED RELEASE ORAL
Qty: 30 TABLET | Refills: 1 | OUTPATIENT
Start: 2021-05-06

## 2021-05-20 ENCOUNTER — TELEPHONE (OUTPATIENT)
Dept: FAMILY MEDICINE | Facility: CLINIC | Age: 56
End: 2021-05-20

## 2021-05-20 NOTE — LETTER
May 20, 2021      Suni Mckeon  [address] [address]        Dear Suni,    I care about your health and have reviewed your health plan. I have reviewed your medical conditions, medication list, and lab results and am making recommendations based on this review, to better manage your health.    You are in particular need of attention regarding:  -Breast Cancer Screening    I am recommending that you:  -schedule a MAMMOGRAM which is due. We have a mobile mammogram unit that comes to [place]  cl[facility].To schedule please call the clinic at [phone]. Or, you can call [facility] at [phone] to schedule this.  Please disregard this reminder if you have had this exam elsewhere within the last year.  It would be helpful for us to have a copy of your mammogram report in our file so that we can best coordinate your care.    Here is a list of Health Maintenance topics that are due now or due soon:  Health Maintenance Due   Topic Date Due     ANNUAL REVIEW OF  ORDERS  Never done     Zoster (Shingles) Vaccine (2 of 2) 01/29/2020     Mammogram  05/07/2020       Please call us at [phone] (or use [employer]) to address the above recommendations.     Thank you for trusting [facility] and we appreciate the opportunity to serve you.  We look forward to supporting your healthcare needs in the future.    Healthy Regards,    Lou Ricardo MD

## 2021-05-20 NOTE — TELEPHONE ENCOUNTER
Patient Quality Outreach      Summary:    Patient has the following on her problem list/HM: None    Patient is due/failing the following:   Breast Cancer Screening - Mammogram    Type of outreach:    Sent letter.    Questions for provider review:    None                                                                                                                                     ROGELIO Weir [facility]       Chart routed to .

## 2021-07-29 NOTE — TELEPHONE ENCOUNTER
Patient Quality Outreach 2nd Attempt      Summary:    Type of outreach:    Phone, left message for patient/parent to call back.    Next Steps:  Reach out within 90 days via Letter.    Max number of attempts reached: Yes. Will try again in 90 days if patient still on fail list.    Questions for provider review:    None                                                                                                                    ROGELIO Weir [facility]       Chart routed to .

## 2021-09-04 ENCOUNTER — HEALTH MAINTENANCE LETTER (OUTPATIENT)
Age: 56
End: 2021-09-04

## 2021-10-13 DIAGNOSIS — F41.9 ANXIETY: ICD-10-CM

## 2021-10-14 RX ORDER — PROPRANOLOL HCL 60 MG
CAPSULE, EXTENDED RELEASE 24HR ORAL
Qty: 30 CAPSULE | Refills: 5 | Status: SHIPPED | OUTPATIENT
Start: 2021-10-14 | End: 2022-04-05

## 2021-10-14 NOTE — TELEPHONE ENCOUNTER
Prescription approved per [facility] Refill Protocol.    Shyann INFANTE RN  EP Triage

## 2021-11-22 DIAGNOSIS — F32.1 MAJOR DEPRESSIVE DISORDER, SINGLE EPISODE, MODERATE (H): ICD-10-CM

## 2021-11-23 NOTE — TELEPHONE ENCOUNTER
Drug Interaction:     Pamelor and Effexor.    Please refill as appropriate.  Thank you,    Layla Lott RN  [facility]

## 2021-11-24 RX ORDER — NORTRIPTYLINE HYDROCHLORIDE 50 MG/1
CAPSULE ORAL
Qty: 180 CAPSULE | Refills: 0 | Status: SHIPPED | OUTPATIENT
Start: 2021-11-24 | End: 2022-02-15

## 2021-11-26 NOTE — TELEPHONE ENCOUNTER
Called pt to update of the form sent throught [employer]. Unable to lvm.     Mae Foreman  Visit Facilitator

## 2021-12-02 NOTE — TELEPHONE ENCOUNTER
3rd attempt  Called and left a VM for the patient to log into [employer] and complete the Ramos/PHQ9 screening and then send it back to us.    Tracie Patricio on 12/2/2021 at 8:58 AM

## 2022-04-05 DIAGNOSIS — F41.9 ANXIETY: ICD-10-CM

## 2022-04-05 DIAGNOSIS — E03.9 HYPOTHYROIDISM, UNSPECIFIED TYPE: ICD-10-CM

## 2022-04-05 RX ORDER — PROPRANOLOL HCL 60 MG
CAPSULE, EXTENDED RELEASE 24HR ORAL
Qty: 90 CAPSULE | Refills: 0 | Status: SHIPPED | OUTPATIENT
Start: 2022-04-05 | End: 2022-05-20

## 2022-04-05 NOTE — LETTER
April 7, 2022      Suni Mckeon  [address]  [place] [address]        Dear Suni,       Our records indicates that it is time for you to be seen for an office visit with Dr. Ricardo.    Please call our office at [phone] to schedule an appointment for follow up.     Please disregard this notice if you have already made an appointment.    If you are no longer a [facility] patient; Please contact us and let us know that as well. You will also need to the let the pharmacy know the name of your current Provider so that they can send future request to them.       Sincerely,    [facility] [place] Staff

## 2022-04-05 NOTE — LETTER
April 13, 2022      Suni Mckeon  [address]  [place] [address]        Dear Suni,       Our records indicates that it is time for you to be seen for an office visit with Dr. Ricardo.    Please call our office at [phone] to schedule an appointment for annual exam with labs.     Please disregard this notice if you have already made an appointment.    If you are no longer a [facility] patient; Please contact us and let us know that as well. You will also need to the let the pharmacy know the name of your current Provider so that they can send future request to them.       Sincerely,    [facility] [place] Staff

## 2022-04-05 NOTE — TELEPHONE ENCOUNTER
Called and sent the patient a e[employer]onne[employer] message to schedule appointment.    Tracie Patricio on 4/5/2022 at 3:59 PM

## 2022-04-05 NOTE — TELEPHONE ENCOUNTER
Routing refill request to provider for review/approval because:  Labs out of range:  TSH    Shyann INFANTE RN  EP Triage

## 2022-04-05 NOTE — TELEPHONE ENCOUNTER
Medication is being filled for 1 time refill only due to:  Patient needs to be seen because due to be seen..      Shyann INFANTE RN  EP Triage

## 2022-04-07 RX ORDER — LEVOTHYROXINE SODIUM 88 UG/1
TABLET ORAL
Qty: 30 TABLET | Refills: 0 | Status: SHIPPED | OUTPATIENT
Start: 2022-04-07 | End: 2022-05-12

## 2022-04-07 NOTE — TELEPHONE ENCOUNTER
Refill ordered for a month.  Patient needs to be seen as she is overdue for labs and annual exam.  Please book her an appointment with me on a sameday hold in the next 1 month.    Lou Ricardo MD  [facility], [place]

## 2022-04-17 DIAGNOSIS — F32.1 MAJOR DEPRESSIVE DISORDER, SINGLE EPISODE, MODERATE (H): ICD-10-CM

## 2022-04-17 NOTE — LETTER
April 26, 2022      Suni Mckeon  [address]  [place] [address]        Dear Suni,       Our records indicates that it is time for you to be seen for an office visit with Dr. Ricardo.    Please call our office at [phone] to schedule an appointment for follow up. We are unable to refill your medication until you are seen.     Please disregard this notice if you have already made an appointment.    If you are no longer a [facility] patient; Please contact us and let us know that as well. You will also need to the let the pharmacy know the name of your current Provider so that they can send future request to them.       Sincerely,    [facility] [place] Staff

## 2022-04-19 NOTE — TELEPHONE ENCOUNTER
Routing refill request to provider for review/approval because:  Madelaine given x1 and patient did not follow up, please advise  Estefania Carranza BSN, RN

## 2022-04-20 ENCOUNTER — MYC MEDICAL ADVICE (OUTPATIENT)
Dept: FAMILY MEDICINE | Facility: CLINIC | Age: 57
End: 2022-04-20
Payer: COMMERCIAL

## 2022-04-20 RX ORDER — NORTRIPTYLINE HYDROCHLORIDE 50 MG/1
CAPSULE ORAL
Qty: 60 CAPSULE | Refills: 0 | OUTPATIENT
Start: 2022-04-20

## 2022-04-20 NOTE — TELEPHONE ENCOUNTER
Refill declined.  Patient needs to be seen for further refills    Lou Ricardo MD  [facility] [place]

## 2022-04-27 RX ORDER — NORTRIPTYLINE HYDROCHLORIDE 50 MG/1
100 CAPSULE ORAL AT BEDTIME
Qty: 180 CAPSULE | Refills: 0 | Status: SHIPPED | OUTPATIENT
Start: 2022-04-27 | End: 2022-05-20

## 2022-04-27 NOTE — TELEPHONE ENCOUNTER
Patient is calling. States that she has most recently seen Edis Yoon PA-C. Thinks that she is switching to Edis Yoon PA-C for PCP. Trying to schedule. Can't get in until May for yearly in clinic visit.    Patient is out of medication. Needs refill ASAP. Will schedule as soon as possible through [employer].    Patient is having symptoms of withdrawal. Feeling very anxious, heart is racing. Hot and light headed in the shower.       PHQ 7/1/2020 4/6/2021 12/3/2021   PHQ-9 Total Score 0 0 0   Q9: Thoughts of better off dead/self-harm past 2 weeks Not at all Not at all Not at all     Aniyah Arshad RN

## 2022-04-27 NOTE — TELEPHONE ENCOUNTER
Please see patient my chart message and advise as appropriate to appointment request.      Jena CHAPPELL - Registered Nurse  [facility]  Acute and Diagnostic Services

## 2022-04-28 NOTE — TELEPHONE ENCOUNTER
Norbertot response sent to patient    Jena F - Registered Nurse  [facility]  Acute and Diagnostic Services

## 2022-04-28 NOTE — TELEPHONE ENCOUNTER
Placed a new order for her nortriptyline yesterday. If she has any urgent concerns she can have a same day spot next week.  If no concerns, ok to keep on schedule for May 16

## 2022-05-19 ASSESSMENT — ENCOUNTER SYMPTOMS
ARTHRALGIAS: 0
HEMATOCHEZIA: 0
BREAST MASS: 0
PARESTHESIAS: 0
MYALGIAS: 0
JOINT SWELLING: 0
CONSTIPATION: 0
FREQUENCY: 0
DYSURIA: 0
SORE THROAT: 0
HEADACHES: 0
COUGH: 0
NERVOUS/ANXIOUS: 0
FEVER: 0
DIZZINESS: 0
HEMATURIA: 0
DIARRHEA: 0
SHORTNESS OF BREATH: 0
NAUSEA: 0
ABDOMINAL PAIN: 0
CHILLS: 0
EYE PAIN: 0
PALPITATIONS: 0
WEAKNESS: 0
HEARTBURN: 0

## 2022-05-19 ASSESSMENT — PATIENT HEALTH QUESTIONNAIRE - PHQ9
SUM OF ALL RESPONSES TO PHQ QUESTIONS 1-9: 0
SUM OF ALL RESPONSES TO PHQ QUESTIONS 1-9: 0

## 2022-05-19 NOTE — PROGRESS NOTES
SUBJECTIVE:   CC: Suni Mckeon is an 56 year old woman who presents for preventive health visit.     Healthy Habits:     Getting at least 3 servings of Calcium per day:  Yes    Bi-annual eye exam:  Yes    Dental care twice a year:  Yes    Sleep apnea or symptoms of sleep apnea:  None    Diet:  Other    Frequency of exercise:  1 day/week    Duration of exercise:  15-30 minutes    Taking medications regularly:  Yes    Medication side effects:  None    PHQ-2 Total Score: 0    Additional concerns today:  Yes        Today's PHQ-2 Score:   PHQ-2 ( 1999 Pfizer) 5/19/2022   Q1: Little interest or pleasure in doing things 0   Q2: Feeling down, depressed or hopeless 0   PHQ-2 Score 0   PHQ-2 Total Score (12-17 Years)- Positive if 3 or more points; Administer PHQ-A if positive -   Q1: Little interest or pleasure in doing things Not at all   Q2: Feeling down, depressed or hopeless Not at all   PHQ-2 Score 0       Abuse: Current or Past (Physical, Sexual or Emotional) - No  Do you feel safe in your environment? Yes        Social History     Tobacco Use     Smoking status: Never Smoker     Smokeless tobacco: Never Used   Substance Use Topics     Alcohol use: Yes     Alcohol/week: 0.0 - 2.0 standard drinks     Comment: 2-3 drinks per mth     Alcohol Use 5/19/2022   Prescreen: >3 drinks/day or >7 drinks/week? No   Prescreen: >3 drinks/day or >7 drinks/week? -     Reviewed orders with patient.  Reviewed health maintenance and updated orders accordingly - Yes  Patient Active Problem List   Diagnosis     Anxiety     Major depressive disorder, single episode, moderate (H)     Acquired hypothyroidism     Past Surgical History:   Procedure Laterality Date     COLONOSCOPY N/A 1/27/2017    NL, repeat 10 years     COLONOSCOPY N/A 1/30/2017    Procedure: COLONOSCOPY;  Surgeon: Dahiana Osuna MD;  Location:  GI     SURGICAL HISTORY OF -   2005    Exc. benign lesion left lower eyelid       Social History     Tobacco Use      Smoking status: Never Smoker     Smokeless tobacco: Never Used   Substance Use Topics     Alcohol use: Yes     Alcohol/week: 0.0 - 2.0 standard drinks     Comment: 2-3 drinks per mth     Family History   Problem Relation Age of Onset     Lipids Father      Psychotic Disorder Maternal Grandmother         depression     Diabetes Paternal Grandmother          Current Outpatient Medications   Medication Sig Dispense Refill     levothyroxine (SYNTHROID/LEVOTHROID) 88 MCG tablet Take 1 tablet (88 mcg) by mouth daily 90 tablet 3     nortriptyline (PAMELOR) 50 MG capsule Take 2 capsules (100 mg) by mouth At Bedtime 180 capsule 1     propranolol ER (INDERAL LA) 60 MG 24 hr capsule Take 1 tablet daily 90 capsule 1     venlafaxine (EFFEXOR XR) 150 MG 24 hr capsule TAKE 1 CAPSULE BY MOUTH DAILY TAKE ALONG WITH 75 MG CAP DAILY 90 capsule 1     venlafaxine (EFFEXOR XR) 75 MG 24 hr capsule TAKE 1 CAPSULE BY MOUTH EVERY DAY 90 capsule 1     Digestive Enzymes CAPS        fish oil-omega-3 fatty acids 1000 MG capsule Take 2 g by mouth daily       Probiotic Product (PROBIOTIC-10 PO)        Allergies   Allergen Reactions     Bupropion Itching     Tobramycin Visual Disturbance     used for pink eye and eyes became more irritated, burning and itchy       Breast Cancer Screening:    Breast CA Risk Assessment (FHS-7) 4/10/2021   Do you have a family history of breast, colon, or ovarian cancer? No / Unknown       Mammo Decision Support - Begin breast cancer screening at age 25 or 8 years after chest radiation treatment with alternating annual mammography and breast MRI  Pertinent mammograms are reviewed under the imaging tab.    History of abnormal Pap smear: NO - age 30-65 PAP every 5 years with negative HPV co-testing recommended  PAP / HPV Latest Ref Rng & Units 5/1/2018 7/9/2014 12/9/2010   PAP (Historical) - NIL NIL NIL   HPV16 NEG:Negative Negative - -   HPV18 NEG:Negative Negative - -   HRHPV NEG:Negative Negative - -     Reviewed and  "updated as needed this visit by clinical staff      Problems               Reviewed and updated as needed this visit by Provider      Problems              Past Medical History:   Diagnosis Date     Anxiety      Irritable bowel syndrome     constipation     Left knee pain      Moderate major depression (H)      Pyelonephritis, unspecified      Recurrent cold sores     Lysine     Thyroid disease       Past Surgical History:   Procedure Laterality Date     COLONOSCOPY N/A 2017    NL, repeat 10 years     COLONOSCOPY N/A 2017    Procedure: COLONOSCOPY;  Surgeon: Dahiana Osuna MD;  Location:  GI     SURGICAL HISTORY OF -       Exc. benign lesion left lower eyelid     OB History    Para Term  AB Living   2 2 2 0 0 2   SAB IAB Ectopic Multiple Live Births   0 0 0 0 0      # Outcome Date GA Lbr Lon/2nd Weight Sex Delivery Anes PTL Lv   2 Term            1 Term                Review of Systems   Constitutional: Negative for chills and fever.   HENT: Negative for congestion, ear pain, hearing loss and sore throat.    Eyes: Negative for pain and visual disturbance.   Respiratory: Negative for cough and shortness of breath.    Cardiovascular: Negative for chest pain, palpitations and peripheral edema.   Gastrointestinal: Negative for abdominal pain, constipation, diarrhea, heartburn, hematochezia and nausea.   Breasts:  Negative for tenderness, breast mass and discharge.   Genitourinary: Negative for dysuria, frequency, genital sores, hematuria, pelvic pain, urgency, vaginal bleeding and vaginal discharge.   Musculoskeletal: Negative for arthralgias, joint swelling and myalgias.   Skin: Negative for rash.   Neurological: Negative for dizziness, weakness, headaches and paresthesias.   Psychiatric/Behavioral: Negative for mood changes. The patient is not nervous/anxious.           OBJECTIVE:   Pulse 71   Temp 97.5  F (36.4  C) (Tympanic)   Ht 1.626 m (5' 4\")   Wt 78 kg (172 lb)   " LMP 12/13/2016 (LMP Unknown)   SpO2 100%   BMI 29.52 kg/m    Physical Exam  GENERAL: healthy, alert and no distress  EYES: Eyes grossly normal to inspection, PERRL and conjunctivae and sclerae normal  HENT: ear canals and TM's normal, nose and mouth without ulcers or lesions  NECK: no adenopathy, no asymmetry, masses, or scars and thyroid normal to palpation  RESP: lungs clear to auscultation - no rales, rhonchi or wheezes  BREAST: normal without masses, tenderness or nipple discharge and no palpable axillary masses or adenopathy  CV: regular rate and rhythm, normal S1 S2, no S3 or S4, no murmur, click or rub, no peripheral edema and peripheral pulses strong  ABDOMEN: soft, nontender, no hepatosplenomegaly, no masses and bowel sounds normal  MS: no gross musculoskeletal defects noted, no edema  SKIN: no suspicious lesions or rashes  NEURO: Normal strength and tone, mentation intact and speech normal  PSYCH: mentation appears normal, affect normal/bright    Diagnostic Test Results:  none     ASSESSMENT/PLAN:     1. Encounter for routine adult health examination without abnormal findings  - Basic metabolic panel  (Ca, Cl, CO2, Creat, Gluc, K, Na, BUN); Future  - Lipid Profile (Chol, Trig, HDL, LDL calc); Future  - Basic metabolic panel  (Ca, Cl, CO2, Creat, Gluc, K, Na, BUN)  - Lipid Profile (Chol, Trig, HDL, LDL calc)    2. Major depressive disorder, single episode, moderate (H)  controlled  - Venlafaxine (EFFEXOR XR) 150 MG 24 hr capsule; TAKE 1 CAPSULE BY MOUTH DAILY TAKE ALONG WITH 75 MG CAP DAILY  Dispense: 90 capsule; Refill: 1  - venlafaxine (EFFEXOR XR) 75 MG 24 hr capsule; TAKE 1 CAPSULE BY MOUTH EVERY DAY  Dispense: 90 capsule; Refill: 1  - nortriptyline (PAMELOR) 50 MG capsule; Take 2 capsules (100 mg) by mouth At Bedtime  Dispense: 180 capsule; Refill: 1    3. Anxiety  stable  - propranolol ER (INDERAL LA) 60 MG 24 hr capsule; Take 1 tablet daily  Dispense: 90 capsule; Refill: 1    4. Hypothyroidism,  "unspecified type  TSH elevated at last visit.  She did not follow up with lab work last year.  Will recheck TSH today  - levothyroxine (SYNTHROID/LEVOTHROID) 88 MCG tablet; Take 1 tablet (88 mcg) by mouth daily  Dispense: 90 tablet; Refill: 3  - TSH; Future  - T4, free; Future  - TSH  - T4, free    5. Visit for screening mammogram  - MA SCREENING DIGITAL BILAT - Future  (s+30); Future    6. High priority for 2019-nCoV vaccine  - COVID-19,PF,PFIZER (12+ Yrs GRAY LABEL)    7. Need for vaccination  - SHINGRIX [[id number]]          COUNSELING:  Reviewed preventive health counseling, as reflected in patient instructions       Regular exercise       Healthy diet/nutrition    Estimated body mass index is 29.52 kg/m  as calculated from the following:    Height as of this encounter: 1.626 m (5' 4\").    Weight as of this encounter: 78 kg (172 lb).    Weight management plan: Discussed healthy diet and exercise guidelines    She reports that she has never smoked. She has never used smokeless tobacco.      Counseling Resources:  ATP IV Guidelines  Pooled Cohorts Equation Calculator  Breast Cancer Risk Calculator  BRCA-Related Cancer Risk Assessment: FHS-7 Tool  FRAX Risk Assessment  ICSI Preventive Guidelines  Dietary Guidelines for Americans, 2010  USDA's MyPlate  ASA Prophylaxis  Lung CA Screening    MARTIN Reyes [facility] [place]  Answers for HPI/ROS submitted by the patient on 5/19/2022  PHQ9 TOTAL SCORE: 0      "

## 2022-05-20 ENCOUNTER — OFFICE VISIT (OUTPATIENT)
Dept: FAMILY MEDICINE | Facility: CLINIC | Age: 57
End: 2022-05-20
Payer: COMMERCIAL

## 2022-05-20 VITALS
BODY MASS INDEX: 29.37 KG/M2 | WEIGHT: 172 LBS | HEIGHT: 64 IN | OXYGEN SATURATION: 100 % | HEART RATE: 71 BPM | TEMPERATURE: 97.5 F

## 2022-05-20 DIAGNOSIS — E03.9 HYPOTHYROIDISM, UNSPECIFIED TYPE: ICD-10-CM

## 2022-05-20 DIAGNOSIS — Z23 HIGH PRIORITY FOR 2019-NCOV VACCINE: ICD-10-CM

## 2022-05-20 DIAGNOSIS — F41.9 ANXIETY: ICD-10-CM

## 2022-05-20 DIAGNOSIS — Z00.00 ENCOUNTER FOR ROUTINE ADULT HEALTH EXAMINATION WITHOUT ABNORMAL FINDINGS: ICD-10-CM

## 2022-05-20 DIAGNOSIS — F32.1 MAJOR DEPRESSIVE DISORDER, SINGLE EPISODE, MODERATE (H): ICD-10-CM

## 2022-05-20 DIAGNOSIS — Z12.31 VISIT FOR SCREENING MAMMOGRAM: Primary | ICD-10-CM

## 2022-05-20 DIAGNOSIS — Z23 NEED FOR VACCINATION: ICD-10-CM

## 2022-05-20 PROCEDURE — 91305 COVID-19,PF,PFIZER (12+ YRS): CPT | Performed by: PHYSICIAN ASSISTANT

## 2022-05-20 PROCEDURE — 90471 IMMUNIZATION ADMIN: CPT | Performed by: PHYSICIAN ASSISTANT

## 2022-05-20 PROCEDURE — 0054A COVID-19,PF,PFIZER (12+ YRS): CPT | Performed by: PHYSICIAN ASSISTANT

## 2022-05-20 PROCEDURE — 90750 HZV VACC RECOMBINANT IM: CPT | Performed by: PHYSICIAN ASSISTANT

## 2022-05-20 PROCEDURE — 36415 COLL VENOUS BLD VENIPUNCTURE: CPT | Performed by: PHYSICIAN ASSISTANT

## 2022-05-20 PROCEDURE — 80048 BASIC METABOLIC PNL TOTAL CA: CPT | Performed by: PHYSICIAN ASSISTANT

## 2022-05-20 PROCEDURE — 84439 ASSAY OF FREE THYROXINE: CPT | Performed by: PHYSICIAN ASSISTANT

## 2022-05-20 PROCEDURE — 84443 ASSAY THYROID STIM HORMONE: CPT | Performed by: PHYSICIAN ASSISTANT

## 2022-05-20 PROCEDURE — 99396 PREV VISIT EST AGE 40-64: CPT | Mod: 25 | Performed by: PHYSICIAN ASSISTANT

## 2022-05-20 PROCEDURE — 80061 LIPID PANEL: CPT | Performed by: PHYSICIAN ASSISTANT

## 2022-05-20 PROCEDURE — 99214 OFFICE O/P EST MOD 30 MIN: CPT | Mod: 25 | Performed by: PHYSICIAN ASSISTANT

## 2022-05-20 RX ORDER — NORTRIPTYLINE HYDROCHLORIDE 50 MG/1
100 CAPSULE ORAL AT BEDTIME
Qty: 180 CAPSULE | Refills: 1 | Status: SHIPPED | OUTPATIENT
Start: 2022-05-20 | End: 2022-11-09

## 2022-05-20 RX ORDER — VENLAFAXINE HYDROCHLORIDE 75 MG/1
CAPSULE, EXTENDED RELEASE ORAL
Qty: 90 CAPSULE | Refills: 1 | Status: SHIPPED | OUTPATIENT
Start: 2022-05-20 | End: 2022-12-13

## 2022-05-20 RX ORDER — LEVOTHYROXINE SODIUM 88 UG/1
88 TABLET ORAL DAILY
Qty: 90 TABLET | Refills: 3 | Status: SHIPPED | OUTPATIENT
Start: 2022-05-20 | End: 2023-05-31

## 2022-05-20 RX ORDER — VENLAFAXINE HYDROCHLORIDE 150 MG/1
CAPSULE, EXTENDED RELEASE ORAL
Qty: 90 CAPSULE | Refills: 1 | Status: SHIPPED | OUTPATIENT
Start: 2022-05-20 | End: 2022-12-13

## 2022-05-20 RX ORDER — PROPRANOLOL HCL 60 MG
CAPSULE, EXTENDED RELEASE 24HR ORAL
Qty: 90 CAPSULE | Refills: 1 | Status: SHIPPED | OUTPATIENT
Start: 2022-05-20 | End: 2022-12-13

## 2022-05-20 ASSESSMENT — ENCOUNTER SYMPTOMS
FREQUENCY: 0
ARTHRALGIAS: 0
CONSTIPATION: 0
COUGH: 0
DYSURIA: 0
FEVER: 0
DIARRHEA: 0
EYE PAIN: 0
SHORTNESS OF BREATH: 0
BREAST MASS: 0
HEMATOCHEZIA: 0
CHILLS: 0
ABDOMINAL PAIN: 0
PARESTHESIAS: 0
NAUSEA: 0
HEADACHES: 0
WEAKNESS: 0
NERVOUS/ANXIOUS: 0
JOINT SWELLING: 0
DIZZINESS: 0
HEMATURIA: 0
HEARTBURN: 0
MYALGIAS: 0
SORE THROAT: 0
PALPITATIONS: 0

## 2022-05-20 ASSESSMENT — PAIN SCALES - GENERAL: PAINLEVEL: NO PAIN (0)

## 2022-05-21 LAB
ANION GAP SERPL CALCULATED.3IONS-SCNC: 2 MMOL/L (ref 3–14)
BUN SERPL-MCNC: 22 MG/DL (ref 7–30)
CALCIUM SERPL-MCNC: 8.8 MG/DL (ref 8.5–10.1)
CHLORIDE BLD-SCNC: 106 MMOL/L (ref 94–109)
CHOLEST SERPL-MCNC: 216 MG/DL
CO2 SERPL-SCNC: 30 MMOL/L (ref 20–32)
CREAT SERPL-MCNC: 0.76 MG/DL (ref 0.52–1.04)
FASTING STATUS PATIENT QL REPORTED: YES
GFR SERPL CREATININE-BSD FRML MDRD: >90 ML/MIN/1.73M2
GLUCOSE BLD-MCNC: 86 MG/DL (ref 70–99)
HDLC SERPL-MCNC: 52 MG/DL
LDLC SERPL CALC-MCNC: 154 MG/DL
NONHDLC SERPL-MCNC: 164 MG/DL
POTASSIUM BLD-SCNC: 4.6 MMOL/L (ref 3.4–5.3)
SODIUM SERPL-SCNC: 138 MMOL/L (ref 133–144)
T4 FREE SERPL-MCNC: 1.19 NG/DL (ref 0.76–1.46)
TRIGL SERPL-MCNC: 49 MG/DL
TSH SERPL DL<=0.005 MIU/L-ACNC: 1.48 MU/L (ref 0.4–4)

## 2022-05-25 NOTE — RESULT ENCOUNTER NOTE
Suni-  Here are your recent results.     Your thyroid labs looks good. You may continue with the same regimen    Your LDL cholesterol is slightly elevated.  Eating and healthy diet and exercising can help improve this.  Please let me  know if you have questions  Edis Yoon

## 2022-10-22 ENCOUNTER — HEALTH MAINTENANCE LETTER (OUTPATIENT)
Age: 57
End: 2022-10-22

## 2022-11-07 DIAGNOSIS — F32.1 MAJOR DEPRESSIVE DISORDER, SINGLE EPISODE, MODERATE (H): ICD-10-CM

## 2022-11-09 RX ORDER — NORTRIPTYLINE HYDROCHLORIDE 50 MG/1
100 CAPSULE ORAL AT BEDTIME
Qty: 60 CAPSULE | Refills: 5 | Status: SHIPPED | OUTPATIENT
Start: 2022-11-09 | End: 2023-05-01

## 2022-11-09 NOTE — TELEPHONE ENCOUNTER
Routing refill request to provider for review/approval because:   Tricyclic Agents ( Annual appt and no PHQ9) Failed 11/09/2022 10:58 AM   Protocol Details  Blood Pressure under 140/90 in past 12 mos        BP Readings from Last 3 Encounters:   04/13/21 112/60   12/04/19 122/82   05/07/19 116/80      Arianna Sahni RN

## 2022-12-10 DIAGNOSIS — F32.1 MAJOR DEPRESSIVE DISORDER, SINGLE EPISODE, MODERATE (H): ICD-10-CM

## 2022-12-10 DIAGNOSIS — F41.9 ANXIETY: ICD-10-CM

## 2022-12-13 RX ORDER — VENLAFAXINE HYDROCHLORIDE 75 MG/1
CAPSULE, EXTENDED RELEASE ORAL
Qty: 90 CAPSULE | Refills: 1 | Status: SHIPPED | OUTPATIENT
Start: 2022-12-13 | End: 2023-05-01

## 2022-12-13 RX ORDER — PROPRANOLOL HCL 60 MG
CAPSULE, EXTENDED RELEASE 24HR ORAL
Qty: 90 CAPSULE | Refills: 1 | Status: SHIPPED | OUTPATIENT
Start: 2022-12-13 | End: 2023-05-31

## 2022-12-13 RX ORDER — VENLAFAXINE HYDROCHLORIDE 150 MG/1
CAPSULE, EXTENDED RELEASE ORAL
Qty: 90 CAPSULE | Refills: 1 | Status: SHIPPED | OUTPATIENT
Start: 2022-12-13 | End: 2023-05-01

## 2022-12-27 ENCOUNTER — TELEPHONE (OUTPATIENT)
Dept: FAMILY MEDICINE | Facility: CLINIC | Age: 57
End: 2022-12-27

## 2022-12-27 NOTE — TELEPHONE ENCOUNTER
Received forms from [employer] to [employer]  Requesting records related to Anxiety/Psychiatric: Actual diagnosis, Date of diagnosis, History of hospitalization, substance abuse, impact on job and current treatments.     Placed form in red folder on AR desk for completion.  Fax completed forms to [phone]    Carmen Dudley/Gavin-  [facility]

## 2023-01-03 NOTE — TELEPHONE ENCOUNTER
Picked up completed forms, attached current med list, faxed to [phone], and sent to [employer].    Sofia GRULLON    [facility]

## 2023-02-07 ENCOUNTER — TELEPHONE (OUTPATIENT)
Dept: FAMILY MEDICINE | Facility: CLINIC | Age: 58
End: 2023-02-07
Payer: COMMERCIAL

## 2023-02-07 NOTE — LETTER
February 14, 2023      Suni Mckeon  [address]  [place] [address]        Dear Suni,    I care about your health and have reviewed your health plan. I have reviewed your medical conditions, medication list, and lab results and am making recommendations based on this review, to better manage your health.    You are in particular need of attention regarding:  -Breast Cancer Screening  -Cervical Cancer Screening    I am recommending that you:  -schedule a MAMMOGRAM which is due. We have a mobile mammogram unit that comes to [place]  cl[facility].To schedule please call the clinic at [phone]. Or, you can call [facility] at [phone] to schedule this.  Please disregard this reminder if you have had this exam elsewhere within the last year.  It would be helpful for us to have a copy of your mammogram report in our file so that we can best coordinate your care.  -schedule a PAP SMEAR EXAM which is due.  Please disregard this reminder if you have had this exam elsewhere within the last year.  It would be helpful for us to have a copy of your recent pap smear report in our file so that we can best coordinate your care.       Here is a list of Health Maintenance topics that are due now or due soon:  Health Maintenance Due   Topic Date Due     Hepatitis B Vaccine (1 of 3 - 3-dose series) Never done     Mammogram  05/07/2020     COVID-19 Vaccine (5 - Booster for Pfizer series) 07/15/2022     Flu Vaccine (1) 09/01/2022     Depression Assessment  11/20/2022     HPV Screening  05/01/2023     PAP Smear  05/01/2023       Please call us at [phone] (or use [employer]) to address the above recommendations.     Thank you for trusting [facility] and we appreciate the opportunity to serve you.  We look forward to supporting your healthcare needs in the future.    Healthy Regards,    Edis Yoon, MPH, PA-C

## 2023-02-07 NOTE — TELEPHONE ENCOUNTER
Patient Quality Outreach    Patient is due for the following:   Breast Cancer Screening - Mammogram  Cervical Cancer Screening - PAP Needed    Next Steps:   No follow up needed at this time.    Type of outreach:    Sent [employer] message. 1st Attempt         Questions for provider review:    None     Jessica Jay RN

## 2023-02-14 NOTE — TELEPHONE ENCOUNTER
Patient Quality Outreach    Patient is due for the following:   Breast Cancer Screening - Mammogram  Cervical Cancer Screening - PAP Needed    Next Steps:   No follow up needed at this time.    Type of outreach:    Sent letter. 2nd attempt     Next Steps:  Reach out within 90 days via Phone.    Max number of attempts reached: Yes. Will try again in 90 days if patient still on fail list.    Questions for provider review:    None     Jessica Jay RN

## 2023-04-01 ENCOUNTER — HEALTH MAINTENANCE LETTER (OUTPATIENT)
Age: 58
End: 2023-04-01

## 2023-04-07 ENCOUNTER — OFFICE VISIT (OUTPATIENT)
Dept: URGENT CARE | Facility: URGENT CARE | Age: 58
End: 2023-04-07
Payer: COMMERCIAL

## 2023-04-07 ENCOUNTER — NURSE TRIAGE (OUTPATIENT)
Dept: FAMILY MEDICINE | Facility: CLINIC | Age: 58
End: 2023-04-07
Payer: COMMERCIAL

## 2023-04-07 VITALS
BODY MASS INDEX: 29.7 KG/M2 | DIASTOLIC BLOOD PRESSURE: 81 MMHG | OXYGEN SATURATION: 99 % | RESPIRATION RATE: 24 BRPM | SYSTOLIC BLOOD PRESSURE: 169 MMHG | TEMPERATURE: 97.2 F | WEIGHT: 173 LBS | HEART RATE: 81 BPM

## 2023-04-07 DIAGNOSIS — L03.313 CELLULITIS OF CHEST WALL: ICD-10-CM

## 2023-04-07 DIAGNOSIS — L55.9 SUNBURN: Primary | ICD-10-CM

## 2023-04-07 PROCEDURE — 99214 OFFICE O/P EST MOD 30 MIN: CPT | Performed by: FAMILY MEDICINE

## 2023-04-07 RX ORDER — METHYLPREDNISOLONE 4 MG
TABLET, DOSE PACK ORAL
Qty: 21 TABLET | Refills: 0 | Status: SHIPPED | OUTPATIENT
Start: 2023-04-07

## 2023-04-07 RX ORDER — CEPHALEXIN 500 MG/1
500 CAPSULE ORAL 3 TIMES DAILY
Qty: 30 CAPSULE | Refills: 0 | Status: SHIPPED | OUTPATIENT
Start: 2023-04-07 | End: 2023-04-17

## 2023-04-07 NOTE — PROGRESS NOTES
SUBJECTIVE: Suni Mckeon is a 57 year old female presenting with a chief complaint of sunburn chest.  Onset of symptoms was week(s) ago.  Current and Associated symptoms: itching and pain  Treatment measures tried include otc.  Predisposing factors include sunburn    Past Medical History:   Diagnosis Date     Anxiety      Irritable bowel syndrome 1994    constipation     Left knee pain      Moderate major depression (H)      Pyelonephritis, unspecified 2006     Recurrent cold sores     Lysine     Thyroid disease      Allergies   Allergen Reactions     Bupropion Itching     Tobramycin Visual Disturbance     used for pink eye and eyes became more irritated, burning and itchy     Social History     Tobacco Use     Smoking status: Never     Smokeless tobacco: Never   Vaping Use     Vaping status: Not on file   Substance Use Topics     Alcohol use: Yes     Alcohol/week: 0.0 - 2.0 standard drinks of alcohol     Comment: 2-3 drinks per mth       ROS:  SKIN: no rash  GI: no vomiting    OBJECTIVE:  BP (!) 169/81   Pulse 81   Temp 97.2  F (36.2  C) (Tympanic)   Resp 24   Wt 78.5 kg (173 lb)   LMP 12/13/2016 (LMP Unknown)   SpO2 99%   BMI 29.70 kg/m  GENERAL APPEARANCE: healthy, alert and no distress  RESP: lungs clear to auscultation - no rales, rhonchi or wheezes  SKIN: chest with redness and warm tenderness      ICD-10-CM    1. Sunburn  L55.9 methylPREDNISolone (MEDROL DOSEPAK) 4 MG tablet therapy pack      2. Cellulitis of chest wall  L03.313 cephALEXin (KEFLEX) 500 MG capsule        OTC antihistamines and hydrocortisone   Fluids/Rest, f/u if worse/not any better

## 2023-04-07 NOTE — TELEPHONE ENCOUNTER
unfortunately I do not have any openings today.  She will need to be seen in UC today for assessment

## 2023-04-07 NOTE — TELEPHONE ENCOUNTER
Pt called crying she has itching in her body and wants to be seen today. Triage offered to schedule OV at [facility] or advised she can see UC at [place]. Pt declines she wants to be seen at primary clinic [facility]. Would PCP approved overbook appt today?     Pt is needing a call back soon.  Pt is willing to see any provider at [facility].

## 2023-04-07 NOTE — TELEPHONE ENCOUNTER
Patient given message from Edis Yoon PA-C. Patient's itching is still just her chest. Denies facial itching or swelling. Patient states that her  is on his way home and will drive her to . Aniyah Arshad RN

## 2023-04-07 NOTE — TELEPHONE ENCOUNTER
TO PCP:     Pt called complaining of skin itching post-sunburn     2 weeks ago burned chest in [place], chest was raised and bubbly. Seemed to be improving. Was taking Advil for pain     Today - took Advil 8am - 3 tablets - Since this AM chest area is so itchy cannot stop itching     Tried Allegra - 10:15am - not helping, topical benadryl also not helping, tried cool water, everything she could think of      Unsure what is going on - skin is hot to touch, no fever     APPEARANCE OF SKIN: initially was 2nd degree sunburn. Now is completely red, blisters did disappear over the 2 weeks.     EXTENT: chest area - no other areas are itchy - breasts up to neck     OTHER: just itching     No OVs available in clinic today. Patient asking what PCP recommends she do? Should she go in to  to have this assessed?     Estefania YEN, Triage RN  [facility]     Additional Information    Negative: Difficult to awaken or acting confused (e.g., disoriented, slurred speech)    Negative: Passed out (i.e., fainted, collapsed and was not responding)    Negative: Sounds like a life-threatening emergency to the triager    Negative: Sunburn - like rash BUT minimal or no sun exposure    Negative: Heat stroke, sunstroke or heat exhaustion suspected    Negative: Too weak to stand    Negative: Fever > 103 F (39.4 C)    Negative: Blisters (second-degree burn) covering > 10% BSA    Negative: Patient sounds very sick or weak to the triager    Negative: SEVERE sunburn pain (e.g., excruciating)    Negative: SEVERE eye pain or blurred vision that follows sun exposure (welding or other significant light exposure)    Negative: Looks infected (e.g., spreading redness, red streak, pus)    Protocols used: SUNBURN-A-OH

## 2023-04-20 ENCOUNTER — PATIENT OUTREACH (OUTPATIENT)
Dept: CARE COORDINATION | Facility: CLINIC | Age: 58
End: 2023-04-20
Payer: COMMERCIAL

## 2023-04-26 ENCOUNTER — E-VISIT (OUTPATIENT)
Dept: FAMILY MEDICINE | Facility: CLINIC | Age: 58
End: 2023-04-26
Payer: COMMERCIAL

## 2023-04-26 DIAGNOSIS — F33.9 EPISODE OF RECURRENT MAJOR DEPRESSIVE DISORDER, UNSPECIFIED DEPRESSION EPISODE SEVERITY (H): Primary | ICD-10-CM

## 2023-04-26 PROCEDURE — 99207 PR NO CHARGE NURSE ONLY: CPT | Performed by: PHYSICIAN ASSISTANT

## 2023-04-26 ASSESSMENT — ANXIETY QUESTIONNAIRES
GAD7 TOTAL SCORE: 21
2. NOT BEING ABLE TO STOP OR CONTROL WORRYING: NEARLY EVERY DAY
1. FEELING NERVOUS, ANXIOUS, OR ON EDGE: NEARLY EVERY DAY
7. FEELING AFRAID AS IF SOMETHING AWFUL MIGHT HAPPEN: NEARLY EVERY DAY
4. TROUBLE RELAXING: NEARLY EVERY DAY
6. BECOMING EASILY ANNOYED OR IRRITABLE: NEARLY EVERY DAY
3. WORRYING TOO MUCH ABOUT DIFFERENT THINGS: NEARLY EVERY DAY
8. IF YOU CHECKED OFF ANY PROBLEMS, HOW DIFFICULT HAVE THESE MADE IT FOR YOU TO DO YOUR WORK, TAKE CARE OF THINGS AT HOME, OR GET ALONG WITH OTHER PEOPLE?: EXTREMELY DIFFICULT
GAD7 TOTAL SCORE: 21
7. FEELING AFRAID AS IF SOMETHING AWFUL MIGHT HAPPEN: NEARLY EVERY DAY
5. BEING SO RESTLESS THAT IT IS HARD TO SIT STILL: NEARLY EVERY DAY
GAD7 TOTAL SCORE: 21

## 2023-04-26 ASSESSMENT — PATIENT HEALTH QUESTIONNAIRE - PHQ9
SUM OF ALL RESPONSES TO PHQ QUESTIONS 1-9: 27
SUM OF ALL RESPONSES TO PHQ QUESTIONS 1-9: 27
10. IF YOU CHECKED OFF ANY PROBLEMS, HOW DIFFICULT HAVE THESE PROBLEMS MADE IT FOR YOU TO DO YOUR WORK, TAKE CARE OF THINGS AT HOME, OR GET ALONG WITH OTHER PEOPLE: EXTREMELY DIFFICULT

## 2023-04-27 ENCOUNTER — TELEPHONE (OUTPATIENT)
Dept: FAMILY MEDICINE | Facility: CLINIC | Age: 58
End: 2023-04-27
Payer: COMMERCIAL

## 2023-04-27 ASSESSMENT — ANXIETY QUESTIONNAIRES: GAD7 TOTAL SCORE: 21

## 2023-04-27 ASSESSMENT — PATIENT HEALTH QUESTIONNAIRE - PHQ9: SUM OF ALL RESPONSES TO PHQ QUESTIONS 1-9: 27

## 2023-04-27 NOTE — TELEPHONE ENCOUNTER
Call attempt #1.       Tried to call patient with no phone answer, unable to leave message.     Tried to call  spouse emergency contact and there was no answer .     Will you proceed with my chart message on the e-visit?     Bridgette Lindo RN  [facility]

## 2023-04-27 NOTE — TELEPHONE ENCOUNTER
Please call patient to triage depression/anxiety symptoms.  Evisit completed today and PHQ very high, patient has not written back to my questions.  Please let me know if  You reach her and how she is doing

## 2023-04-27 NOTE — TELEPHONE ENCOUNTER
Thank you.  Yes, I will see if she responds in the next 24 hours. It does not appear that she has read my response yet

## 2023-04-29 DIAGNOSIS — F32.1 MAJOR DEPRESSIVE DISORDER, SINGLE EPISODE, MODERATE (H): ICD-10-CM

## 2023-05-01 ENCOUNTER — VIRTUAL VISIT (OUTPATIENT)
Dept: FAMILY MEDICINE | Facility: CLINIC | Age: 58
End: 2023-05-01
Payer: COMMERCIAL

## 2023-05-01 ENCOUNTER — TELEPHONE (OUTPATIENT)
Dept: FAMILY MEDICINE | Facility: CLINIC | Age: 58
End: 2023-05-01

## 2023-05-01 DIAGNOSIS — F41.9 ANXIETY: ICD-10-CM

## 2023-05-01 DIAGNOSIS — F32.1 MAJOR DEPRESSIVE DISORDER, SINGLE EPISODE, MODERATE (H): ICD-10-CM

## 2023-05-01 DIAGNOSIS — E03.9 ACQUIRED HYPOTHYROIDISM: Primary | ICD-10-CM

## 2023-05-01 PROCEDURE — 99214 OFFICE O/P EST MOD 30 MIN: CPT | Mod: VID | Performed by: PHYSICIAN ASSISTANT

## 2023-05-01 RX ORDER — NORTRIPTYLINE HYDROCHLORIDE 50 MG/1
100 CAPSULE ORAL AT BEDTIME
Qty: 60 CAPSULE | Refills: 5 | OUTPATIENT
Start: 2023-05-01

## 2023-05-01 RX ORDER — VENLAFAXINE HYDROCHLORIDE 150 MG/1
CAPSULE, EXTENDED RELEASE ORAL
Qty: 90 CAPSULE | Refills: 1 | Status: SHIPPED | OUTPATIENT
Start: 2023-05-01 | End: 2023-11-28

## 2023-05-01 RX ORDER — NORTRIPTYLINE HYDROCHLORIDE 50 MG/1
CAPSULE ORAL
Qty: 270 CAPSULE | Refills: 1 | Status: SHIPPED | OUTPATIENT
Start: 2023-05-01 | End: 2023-11-10

## 2023-05-01 RX ORDER — VENLAFAXINE HYDROCHLORIDE 75 MG/1
75 CAPSULE, EXTENDED RELEASE ORAL DAILY
Qty: 90 CAPSULE | Refills: 1 | Status: SHIPPED | OUTPATIENT
Start: 2023-05-01 | End: 2023-11-28

## 2023-05-01 ASSESSMENT — ANXIETY QUESTIONNAIRES
8. IF YOU CHECKED OFF ANY PROBLEMS, HOW DIFFICULT HAVE THESE MADE IT FOR YOU TO DO YOUR WORK, TAKE CARE OF THINGS AT HOME, OR GET ALONG WITH OTHER PEOPLE?: EXTREMELY DIFFICULT
4. TROUBLE RELAXING: NEARLY EVERY DAY
7. FEELING AFRAID AS IF SOMETHING AWFUL MIGHT HAPPEN: NEARLY EVERY DAY
2. NOT BEING ABLE TO STOP OR CONTROL WORRYING: NEARLY EVERY DAY
IF YOU CHECKED OFF ANY PROBLEMS ON THIS QUESTIONNAIRE, HOW DIFFICULT HAVE THESE PROBLEMS MADE IT FOR YOU TO DO YOUR WORK, TAKE CARE OF THINGS AT HOME, OR GET ALONG WITH OTHER PEOPLE: EXTREMELY DIFFICULT
3. WORRYING TOO MUCH ABOUT DIFFERENT THINGS: NEARLY EVERY DAY
1. FEELING NERVOUS, ANXIOUS, OR ON EDGE: NEARLY EVERY DAY
GAD7 TOTAL SCORE: 21
6. BECOMING EASILY ANNOYED OR IRRITABLE: NEARLY EVERY DAY
7. FEELING AFRAID AS IF SOMETHING AWFUL MIGHT HAPPEN: NEARLY EVERY DAY
GAD7 TOTAL SCORE: 21
5. BEING SO RESTLESS THAT IT IS HARD TO SIT STILL: NEARLY EVERY DAY
GAD7 TOTAL SCORE: 21

## 2023-05-01 ASSESSMENT — PATIENT HEALTH QUESTIONNAIRE - PHQ9
10. IF YOU CHECKED OFF ANY PROBLEMS, HOW DIFFICULT HAVE THESE PROBLEMS MADE IT FOR YOU TO DO YOUR WORK, TAKE CARE OF THINGS AT HOME, OR GET ALONG WITH OTHER PEOPLE: EXTREMELY DIFFICULT
SUM OF ALL RESPONSES TO PHQ QUESTIONS 1-9: 24
SUM OF ALL RESPONSES TO PHQ QUESTIONS 1-9: 24

## 2023-05-01 NOTE — TELEPHONE ENCOUNTER
Provider Recommendation Follow Up:   Reached patient/caregiver. Informed of provider's recommendations. Patient verbalized understanding and agrees with the plan.    Pt scheduled for VV with PCP today at 1020 per Edis HUNT RN  [facility] Triage Team

## 2023-05-01 NOTE — TELEPHONE ENCOUNTER
Patient wrote back to silverio.  Needs VV to discuss medication changes.   Please call her to schedule.   Can see her toay in my acute spot  ( 1030) or tomorrow during a virtual spot ( Tuesday)

## 2023-05-01 NOTE — PROGRESS NOTES
"Suni is a 57 year old who is being evaluated via a billable video visit.      How would you like to obtain your AVS? MyChart  If the video visit is dropped, the invitation should be resent by: Text to cell phone: [phone]  Will anyone else be joining your video visit? No        Assessment & Plan     Major depressive disorder, single episode, moderate (H)  Had a long discussion with patient about options including reducing Effexor and starting an alternative or adding a medication at this time.  For now, will increase nortriptyline as she reports an improvement in her depression in the past when this dose was increased.   Close follow up in 4 weeks for med check in person.  Will also check her thyroid at the follow up visit  Strongly encouraged her to see a therapist, referral placed.   - nortriptyline (PAMELOR) 50 MG capsule; Take 3 capsules (150 mg) nightly  - venlafaxine (EFFEXOR XR) 150 MG 24 hr capsule; Take 1 capsule daily along with 75 mg daily  - venlafaxine (EFFEXOR XR) 75 MG 24 hr capsule; Take 1 capsule (75 mg) by mouth daily  - Adult Mental Health  Referral; Future    Anxiety  - Adult Mental Health  Referral; Future    Acquired hypothyroidism  See above    Total time: 49 minutes:  Time spent discussing symptoms, options for treatment and appropriate follow up.  All questions answered     BMI:   Estimated body mass index is 29.7 kg/m  as calculated from the following:    Height as of 5/20/22: 1.626 m (5' 4\").    Weight as of 4/7/23: 78.5 kg (173 lb).     Edis Yoon PA-C  [facility]    Sraavn Culp is a 57 year old, presenting for the following health issues:  No chief complaint on file.         View : No data to display.              HPI     Potential medication change:  - venlafaxine 225 mg daily  - looking to change prescription to a different one instead  - feels that the medication is not effective anymore  PHQ-9 completed. "   Depression has gotten worse since last visit  - denies any new symptoms associated with depression  - denies any concerns regarding drugs or alcohol use.       Suni presents via video visit for med check.  She is currently taking Effexor  mg daily, nortriptyline 100 mg nightly and propranolol.  She is very down, depressed.  Worried that she may lose her job.  She cannot identify a specific trigger for these symptoms.   She is not having SE with Effexor but feels like it is no longer working for her. It was previously very helpful.   She has tried several SSRIs that have been ineffective (lexapro, zoloft) in the past and had a bad reaction to Wellbutrin.   No SI/HI  She is not currently seeing a therapist    Review of Systems   Constitutional, HEENT, cardiovascular, pulmonary, GI, , musculoskeletal, neuro, skin, endocrine and psych systems are negative, except as otherwise noted.      Objective           Vitals:  No vitals were obtained today due to virtual visit.    Physical Exam   GENERAL: Healthy, alert and no distress  EYES: Eyes grossly normal to inspection.  No discharge or erythema, or obvious scleral/conjunctival abnormalities.  RESP: No audible wheeze, cough, or visible cyanosis.  No visible retractions or increased work of breathing.    SKIN: Visible skin clear. No significant rash, abnormal pigmentation or lesions.  NEURO: Cranial nerves grossly intact.  Mentation and speech appropriate for age.  PSYCH: Mentation appears normal, affect normal/bright, judgement and insight intact, normal speech and appearance well-groomed.            Video-Visit Details    Type of service:  Video Visit     Originating Location (pt. Location): Home    Distant Location (provider location):  On-site  Platform used for Video Visit: [employer]

## 2023-05-04 ENCOUNTER — PATIENT OUTREACH (OUTPATIENT)
Dept: CARE COORDINATION | Facility: CLINIC | Age: 58
End: 2023-05-04
Payer: COMMERCIAL

## 2023-05-23 ENCOUNTER — TELEPHONE (OUTPATIENT)
Dept: FAMILY MEDICINE | Facility: CLINIC | Age: 58
End: 2023-05-23
Payer: COMMERCIAL

## 2023-05-31 DIAGNOSIS — E03.9 HYPOTHYROIDISM, UNSPECIFIED TYPE: ICD-10-CM

## 2023-05-31 DIAGNOSIS — F41.9 ANXIETY: ICD-10-CM

## 2023-05-31 RX ORDER — LEVOTHYROXINE SODIUM 88 UG/1
TABLET ORAL
Qty: 90 TABLET | Refills: 3 | Status: SHIPPED | OUTPATIENT
Start: 2023-05-31 | End: 2024-05-24

## 2023-05-31 RX ORDER — PROPRANOLOL HCL 60 MG
CAPSULE, EXTENDED RELEASE 24HR ORAL
Qty: 30 CAPSULE | Refills: 5 | Status: SHIPPED | OUTPATIENT
Start: 2023-05-31 | End: 2023-11-24

## 2023-08-27 ENCOUNTER — HEALTH MAINTENANCE LETTER (OUTPATIENT)
Age: 58
End: 2023-08-27

## 2023-11-23 DIAGNOSIS — F41.9 ANXIETY: ICD-10-CM

## 2023-11-24 RX ORDER — PROPRANOLOL HCL 60 MG
CAPSULE, EXTENDED RELEASE 24HR ORAL
Qty: 30 CAPSULE | Refills: 5 | Status: SHIPPED | OUTPATIENT
Start: 2023-11-24 | End: 2024-05-24

## 2023-11-28 ENCOUNTER — TELEPHONE (OUTPATIENT)
Dept: FAMILY MEDICINE | Facility: CLINIC | Age: 58
End: 2023-11-28
Payer: COMMERCIAL

## 2023-11-28 ENCOUNTER — MYC REFILL (OUTPATIENT)
Dept: FAMILY MEDICINE | Facility: CLINIC | Age: 58
End: 2023-11-28
Payer: COMMERCIAL

## 2023-11-28 DIAGNOSIS — F32.1 MAJOR DEPRESSIVE DISORDER, SINGLE EPISODE, MODERATE (H): ICD-10-CM

## 2023-11-28 NOTE — LETTER
December 12, 2023      Suni Mckeon  [address]  [place] [address]            Dear Suni,    I care about your health and have reviewed your health plan. I have reviewed your medical conditions, medication list, and lab results and am making recommendations based on this review, to better manage your health.    You are in particular need of attention regarding:  -Depression  -Breast Cancer Screening  -Cervical Cancer Screening  -Wellness (Physical) Visit     I am recommending that you:  -schedule a WELLNESS (Physical) APPOINTMENT with me.   I will check fasting labs the same day - nothing to eat except water and meds for 8-10 hours prior. (If you go elsewhere for Wellness visits then please disregard this reminder.)    -schedule a MAMMOGRAM which is due.    1 in 8 women will develop invasive breast cancer during her lifetime and it is the most common non-skin cancer in American women.  EARLY detection, new treatments, and a better understanding of the disease have increased survival rates - the 5 year survival rate in the 1960s was 63% and today it is close to 90%.    If you are under/uninsured, we recommend you contact the [facility]. They offer mammograms at no charge or on a sliding fee charge. You can schedule with them at [phone]. Please have them send us the results.      Please disregard this reminder if you have had this exam elsewhere within the last year.  It would be helpful for us to have a copy of your mammogram report in your file so that we can best coordinate your care - please contact us with when your test was done so we can update your record.             -schedule a PAP SMEAR EXAM which is due.  Please disregard this reminder if you have had this exam elsewhere within the last year.  It would be helpful for us to have a copy of your recent pap smear report in our file so that we can best coordinate your care.    If you are under/uninsured, we recommend you contact the  [facility]. They offer pap smears at no charge or on a sliding fee charge. You can schedule with them at [phone]. Please have them send us the results.         Here is a list of Health Maintenance topics that are due now or due soon:  Health Maintenance Due   Topic Date Due    HEPATITIS B IMMUNIZATION (1 of 3 - 3-dose series) Never done    MAMMO SCREENING  05/07/2020    HPV TEST  05/01/2023    PAP  05/01/2023    YEARLY PREVENTIVE VISIT  05/20/2023    TSH W/FREE T4 REFLEX  05/20/2023    ANNUAL REVIEW OF HM ORDERS  05/20/2023    DEPRESSION 6 MO INDEX REPEAT PHQ-9  08/27/2023    INFLUENZA VACCINE (1) 09/01/2023    COVID-19 Vaccine (5 - 2023-24 season) 09/01/2023    PHQ-9  11/01/2023        Please call us (or use [employer]) to address the above recommendations.     Thank you for trusting [facility] and we appreciate the opportunity to serve you.  We look forward to supporting your healthcare needs in the future.    Healthy Regards,    Edis Yoon, MPH, PA-C

## 2023-11-28 NOTE — TELEPHONE ENCOUNTER
Patient Quality Outreach    Patient is due for the following:   Breast Cancer Screening - Mammogram  Cervical Cancer Screening - PAP Needed  Depression  -  PHQ-9 needed  Physical Preventive Adult Physical    Next Steps:   No follow up needed at this time.  Patient was assigned appropriate questionnaire to complete    Type of outreach:    Sent [employer] message. 1st attempt      Questions for provider review:    None           Jessica Jay RN

## 2023-11-29 RX ORDER — VENLAFAXINE HYDROCHLORIDE 150 MG/1
CAPSULE, EXTENDED RELEASE ORAL
Qty: 90 CAPSULE | Refills: 1 | Status: SHIPPED | OUTPATIENT
Start: 2023-11-29 | End: 2024-05-27

## 2023-11-29 RX ORDER — VENLAFAXINE HYDROCHLORIDE 75 MG/1
75 CAPSULE, EXTENDED RELEASE ORAL DAILY
Qty: 90 CAPSULE | Refills: 1 | Status: SHIPPED | OUTPATIENT
Start: 2023-11-29 | End: 2024-05-27

## 2023-12-12 NOTE — TELEPHONE ENCOUNTER
Patient Quality Outreach    Patient is due for the following:   Breast Cancer Screening - Mammogram  Cervical Cancer Screening - PAP Needed  Depression  -  PHQ-9 needed  Physical Preventive Adult Physical    Next Steps:   No follow up needed at this time.  Patient was assigned appropriate questionnaire to complete    Type of outreach:    Sent letter.    Next Steps:  Reach out within 90 days via Phone.    Max number of attempts reached: Yes. Will try again in 90 days if patient still on fail list.    Questions for provider review:    None           Jessica Jay RN

## 2024-04-23 DIAGNOSIS — F32.1 MAJOR DEPRESSIVE DISORDER, SINGLE EPISODE, MODERATE (H): ICD-10-CM

## 2024-04-23 RX ORDER — NORTRIPTYLINE HYDROCHLORIDE 50 MG/1
CAPSULE ORAL
Qty: 90 CAPSULE | Refills: 5 | OUTPATIENT
Start: 2024-04-23

## 2024-04-29 ENCOUNTER — MYC MEDICAL ADVICE (OUTPATIENT)
Dept: FAMILY MEDICINE | Facility: CLINIC | Age: 59
End: 2024-04-29
Payer: COMMERCIAL

## 2024-04-29 DIAGNOSIS — F32.1 MAJOR DEPRESSIVE DISORDER, SINGLE EPISODE, MODERATE (H): ICD-10-CM

## 2024-04-30 RX ORDER — NORTRIPTYLINE HYDROCHLORIDE 50 MG/1
CAPSULE ORAL
Qty: 90 CAPSULE | Refills: 5 | Status: SHIPPED | OUTPATIENT
Start: 2024-04-30 | End: 2024-09-23

## 2024-04-30 NOTE — TELEPHONE ENCOUNTER
Pt requesting a refill.  Medication pended, routing to refill team for review.  Routing HP as pt is out.    Thank you,  Sandhya Londono RN

## 2024-05-24 DIAGNOSIS — F32.1 MAJOR DEPRESSIVE DISORDER, SINGLE EPISODE, MODERATE (H): ICD-10-CM

## 2024-05-27 RX ORDER — VENLAFAXINE HYDROCHLORIDE 150 MG/1
CAPSULE, EXTENDED RELEASE ORAL
Qty: 90 CAPSULE | Refills: 0 | Status: SHIPPED | OUTPATIENT
Start: 2024-05-27 | End: 2024-08-16

## 2024-05-27 RX ORDER — VENLAFAXINE HYDROCHLORIDE 75 MG/1
75 CAPSULE, EXTENDED RELEASE ORAL DAILY
Qty: 90 CAPSULE | Refills: 0 | Status: SHIPPED | OUTPATIENT
Start: 2024-05-27 | End: 2024-09-23

## 2024-08-16 DIAGNOSIS — F32.1 MAJOR DEPRESSIVE DISORDER, SINGLE EPISODE, MODERATE (H): ICD-10-CM

## 2024-08-16 RX ORDER — VENLAFAXINE HYDROCHLORIDE 150 MG/1
CAPSULE, EXTENDED RELEASE ORAL
Qty: 90 CAPSULE | Refills: 0 | Status: SHIPPED | OUTPATIENT
Start: 2024-08-16

## 2024-08-20 DIAGNOSIS — F41.9 ANXIETY: ICD-10-CM

## 2024-08-20 DIAGNOSIS — F32.1 MAJOR DEPRESSIVE DISORDER, SINGLE EPISODE, MODERATE (H): ICD-10-CM

## 2024-08-20 DIAGNOSIS — E03.9 HYPOTHYROIDISM, UNSPECIFIED TYPE: ICD-10-CM

## 2024-08-20 RX ORDER — VENLAFAXINE HYDROCHLORIDE 75 MG/1
75 CAPSULE, EXTENDED RELEASE ORAL DAILY
Qty: 30 CAPSULE | Refills: 2 | OUTPATIENT
Start: 2024-08-20

## 2024-08-20 RX ORDER — PROPRANOLOL HCL 60 MG
CAPSULE, EXTENDED RELEASE 24HR ORAL
Qty: 30 CAPSULE | Refills: 2 | Status: SHIPPED | OUTPATIENT
Start: 2024-08-20

## 2024-08-20 RX ORDER — LEVOTHYROXINE SODIUM 88 UG/1
TABLET ORAL
Qty: 30 TABLET | Refills: 2 | Status: SHIPPED | OUTPATIENT
Start: 2024-08-20

## 2024-09-21 DIAGNOSIS — F32.1 MAJOR DEPRESSIVE DISORDER, SINGLE EPISODE, MODERATE (H): ICD-10-CM

## 2024-09-21 NOTE — LETTER
October 2, 2024      Suni Mckeon  [address]  [place] [address]      Miguelito Culp,    Our records indicate that it is time to schedule a visit with your primary care provider.  You are due to be seen for a routine annual preventative visit.    You may call [phone] to schedule or via [employer] using the appointment tab.  Schedules fill quickly, so we recommend scheduling at this time.     If you are no longer a [facility] patient; please contact us and let us know that as well.  You will need to let the pharmacy know the name of your new provider so that they can send future refill requests to them.    Thank you       [facility] [place]

## 2024-09-23 RX ORDER — VENLAFAXINE HYDROCHLORIDE 75 MG/1
75 CAPSULE, EXTENDED RELEASE ORAL DAILY
Qty: 30 CAPSULE | Refills: 2 | Status: SHIPPED | OUTPATIENT
Start: 2024-09-23

## 2024-09-23 RX ORDER — NORTRIPTYLINE HYDROCHLORIDE 50 MG/1
CAPSULE ORAL
Qty: 90 CAPSULE | Refills: 5 | Status: SHIPPED | OUTPATIENT
Start: 2024-09-23

## 2024-10-20 ENCOUNTER — HEALTH MAINTENANCE LETTER (OUTPATIENT)
Age: 59
End: 2024-10-20

## 2024-11-06 ENCOUNTER — TELEPHONE (OUTPATIENT)
Dept: FAMILY MEDICINE | Facility: CLINIC | Age: 59
End: 2024-11-06
Payer: COMMERCIAL

## 2024-11-06 NOTE — LETTER
November 26, 2024      Suni Mckeon  [address] [facility] [address]        Dear Suni,    I care about your health and have reviewed your health plan. I have reviewed your medical conditions, medication list, and lab results and am making recommendations based on this review, to better manage your health.    You are in particular need of attention regarding:  -Depression  -Breast Cancer Screening  -Cervical Cancer Screening  -Wellness (Physical) Visit     I am recommending that you:  -schedule a WELLNESS (Physical) APPOINTMENT with me.   I will check fasting labs the same day - nothing to eat except water and meds for 8-10 hours prior. (If you go elsewhere for Wellness visits then please disregard this reminder.)  -schedule a MAMMOGRAM which is due. We have a mobile mammogram unit that comes to [place]  cl[facility].To schedule please call the clinic at [phone]. Or, you can call [facility] at [phone] to schedule this.  Please disregard this reminder if you have had this exam elsewhere within the last year.  It would be helpful for us to have a copy of your mammogram report in our file so that we can best coordinate your care.  -schedule a PAP SMEAR EXAM which is due.  Please disregard this reminder if you have had this exam elsewhere within the last year.  It would be helpful for us to have a copy of your recent pap smear report in our file so that we can best coordinate your care.       Here is a list of Health Maintenance topics that are due now or due soon:  Health Maintenance Due   Topic Date Due    Mammogram  05/07/2020    HPV Screening  05/01/2023    PAP Smear  05/01/2023    Yearly Preventive Visit  05/20/2023    Thyroid Function Lab  05/20/2023    ANNUAL REVIEW OF HM ORDERS  05/20/2023    Depression Assessment  11/01/2023    Flu Vaccine (1) 09/01/2024    COVID-19 Vaccine (5 - 2024-25 season) 09/01/2024       Please call us at [phone] (or use [employer]) to  address the above recommendations.     Thank you for trusting [facility] and we appreciate the opportunity to serve you.  We look forward to supporting your healthcare needs in the future.    Healthy Regards,    Edis Yoon, MPH, PA-C

## 2024-11-06 NOTE — TELEPHONE ENCOUNTER
Patient Quality Outreach    Patient is due for the following:   Breast Cancer Screening - Mammogram  Cervical Cancer Screening - PAP Needed  Depression  -  PHQ-9 needed  Physical Preventive Adult Physical    Next Steps:   Schedule a Adult Preventative  No follow up needed at this time.    Type of outreach:    Phone, left message for patient/parent to call back.      Questions for provider review:    None           Jessica Jay RN

## 2024-11-18 NOTE — NURSING NOTE
"Chief Complaint   Patient presents with     Establish Care     Gyn Exam       Initial /80 (BP Location: Right arm, Patient Position: Chair, Cuff Size: Adult Large)  Pulse 82  Temp 97.5  F (36.4  C) (Tympanic)  Resp 16  Ht 5' 5\" (1.651 m)  Wt 185 lb 12.8 oz (84.3 kg)  LMP 09/19/2017 (Approximate)  SpO2 96%  BMI 30.92 kg/m2 Estimated body mass index is 30.92 kg/(m^2) as calculated from the following:    Height as of this encounter: 5' 5\" (1.651 m).    Weight as of this encounter: 185 lb 12.8 oz (84.3 kg).  Medication Reconciliation: complete    Jada Alexander MA  " dizziness

## 2024-11-19 DIAGNOSIS — F32.1 MAJOR DEPRESSIVE DISORDER, SINGLE EPISODE, MODERATE (H): ICD-10-CM

## 2024-11-19 DIAGNOSIS — E03.9 HYPOTHYROIDISM, UNSPECIFIED TYPE: ICD-10-CM

## 2024-11-19 DIAGNOSIS — F41.9 ANXIETY: ICD-10-CM

## 2024-11-19 NOTE — LETTER
November 27, 2024      Suni Mckeon  [address]  [place] [address]      Miguelito Culp,    Our records indicate that it is time to schedule a visit with your primary care provider.  You are due to be seen for a routine annual preventative visit.  We have sent to the pharmacy a 1 month refill of your medication until you can be seen by your provider.  You may call [phone] to schedule or via [employer] using the appointment tab.  Schedules fill quickly, so we recommend scheduling at this time.     If you are no longer a [facility] patient; please contact us and let us know that as well.  You will need to let the pharmacy know the name of your new provider so that they can send future refill requests to them.        Sincerely,        Brennan Mcneal MD    [facility] - [place]

## 2024-11-20 RX ORDER — VENLAFAXINE HYDROCHLORIDE 150 MG/1
CAPSULE, EXTENDED RELEASE ORAL
Qty: 30 CAPSULE | Refills: 0 | Status: SHIPPED | OUTPATIENT
Start: 2024-11-20

## 2024-11-20 RX ORDER — PROPRANOLOL HYDROCHLORIDE 60 MG/1
CAPSULE, EXTENDED RELEASE ORAL
Qty: 30 CAPSULE | Refills: 0 | Status: SHIPPED | OUTPATIENT
Start: 2024-11-20

## 2024-11-20 RX ORDER — LEVOTHYROXINE SODIUM 88 UG/1
TABLET ORAL
Qty: 30 TABLET | Refills: 0 | Status: SHIPPED | OUTPATIENT
Start: 2024-11-20

## 2024-11-25 NOTE — TELEPHONE ENCOUNTER
Chief Complaint   Patient presents with    Medication Refill     2nd attempt: LVM requesting for a call back. Patient was informed that they are due for an annual physical exam. Informed that provider filled 30 day supply of medications and for future refills appointment is needed.       Roxana Higuera CMA 11/25/2024 10:59 AM

## 2024-12-18 DIAGNOSIS — F32.1 MAJOR DEPRESSIVE DISORDER, SINGLE EPISODE, MODERATE (H): ICD-10-CM

## 2024-12-18 RX ORDER — VENLAFAXINE HYDROCHLORIDE 75 MG/1
75 CAPSULE, EXTENDED RELEASE ORAL DAILY
Qty: 30 CAPSULE | Refills: 2 | Status: SHIPPED | OUTPATIENT
Start: 2024-12-18

## 2025-02-11 SDOH — HEALTH STABILITY: PHYSICAL HEALTH: ON AVERAGE, HOW MANY MINUTES DO YOU ENGAGE IN EXERCISE AT THIS LEVEL?: 60 MIN

## 2025-02-11 SDOH — HEALTH STABILITY: PHYSICAL HEALTH: ON AVERAGE, HOW MANY DAYS PER WEEK DO YOU ENGAGE IN MODERATE TO STRENUOUS EXERCISE (LIKE A BRISK WALK)?: 2 DAYS

## 2025-02-11 ASSESSMENT — PATIENT HEALTH QUESTIONNAIRE - PHQ9
SUM OF ALL RESPONSES TO PHQ QUESTIONS 1-9: 0
10. IF YOU CHECKED OFF ANY PROBLEMS, HOW DIFFICULT HAVE THESE PROBLEMS MADE IT FOR YOU TO DO YOUR WORK, TAKE CARE OF THINGS AT HOME, OR GET ALONG WITH OTHER PEOPLE: NOT DIFFICULT AT ALL
SUM OF ALL RESPONSES TO PHQ QUESTIONS 1-9: 0

## 2025-02-11 ASSESSMENT — SOCIAL DETERMINANTS OF HEALTH (SDOH): HOW OFTEN DO YOU GET TOGETHER WITH FRIENDS OR RELATIVES?: ONCE A WEEK

## 2025-02-12 ENCOUNTER — OFFICE VISIT (OUTPATIENT)
Dept: FAMILY MEDICINE | Facility: CLINIC | Age: 60
End: 2025-02-12
Payer: COMMERCIAL

## 2025-02-12 VITALS
DIASTOLIC BLOOD PRESSURE: 81 MMHG | BODY MASS INDEX: 31.77 KG/M2 | HEIGHT: 65 IN | HEART RATE: 77 BPM | TEMPERATURE: 97.2 F | OXYGEN SATURATION: 100 % | SYSTOLIC BLOOD PRESSURE: 123 MMHG | RESPIRATION RATE: 16 BRPM | WEIGHT: 190.7 LBS

## 2025-02-12 DIAGNOSIS — F41.9 ANXIETY: ICD-10-CM

## 2025-02-12 DIAGNOSIS — E03.9 ACQUIRED HYPOTHYROIDISM: ICD-10-CM

## 2025-02-12 DIAGNOSIS — Z12.31 VISIT FOR SCREENING MAMMOGRAM: ICD-10-CM

## 2025-02-12 DIAGNOSIS — E66.811 CLASS 1 OBESITY DUE TO EXCESS CALORIES WITHOUT SERIOUS COMORBIDITY WITH BODY MASS INDEX (BMI) OF 32.0 TO 32.9 IN ADULT: ICD-10-CM

## 2025-02-12 DIAGNOSIS — Z00.00 ROUTINE GENERAL MEDICAL EXAMINATION AT A HEALTH CARE FACILITY: Primary | ICD-10-CM

## 2025-02-12 DIAGNOSIS — F32.1 MAJOR DEPRESSIVE DISORDER, SINGLE EPISODE, MODERATE (H): ICD-10-CM

## 2025-02-12 DIAGNOSIS — E66.09 CLASS 1 OBESITY DUE TO EXCESS CALORIES WITHOUT SERIOUS COMORBIDITY WITH BODY MASS INDEX (BMI) OF 32.0 TO 32.9 IN ADULT: ICD-10-CM

## 2025-02-12 DIAGNOSIS — E03.9 HYPOTHYROIDISM, UNSPECIFIED TYPE: ICD-10-CM

## 2025-02-12 DIAGNOSIS — Z12.4 CERVICAL CANCER SCREENING: ICD-10-CM

## 2025-02-12 LAB
ALBUMIN SERPL BCG-MCNC: 4.4 G/DL (ref 3.5–5.2)
ALP SERPL-CCNC: 139 U/L (ref 40–150)
ALT SERPL W P-5'-P-CCNC: 18 U/L (ref 0–50)
ANION GAP SERPL CALCULATED.3IONS-SCNC: 11 MMOL/L (ref 7–15)
AST SERPL W P-5'-P-CCNC: 24 U/L (ref 0–45)
BILIRUB SERPL-MCNC: 0.3 MG/DL
BUN SERPL-MCNC: 16 MG/DL (ref 8–23)
CALCIUM SERPL-MCNC: 9.3 MG/DL (ref 8.8–10.4)
CHLORIDE SERPL-SCNC: 101 MMOL/L (ref 98–107)
CHOLEST SERPL-MCNC: 213 MG/DL
CREAT SERPL-MCNC: 0.87 MG/DL (ref 0.51–0.95)
EGFRCR SERPLBLD CKD-EPI 2021: 76 ML/MIN/1.73M2
EST. AVERAGE GLUCOSE BLD GHB EST-MCNC: 108 MG/DL
FASTING STATUS PATIENT QL REPORTED: YES
FASTING STATUS PATIENT QL REPORTED: YES
GLUCOSE SERPL-MCNC: 81 MG/DL (ref 70–99)
HBA1C MFR BLD: 5.4 % (ref 0–5.6)
HCO3 SERPL-SCNC: 26 MMOL/L (ref 22–29)
HDLC SERPL-MCNC: 45 MG/DL
HPV HR 12 DNA CVX QL NAA+PROBE: NEGATIVE
HPV16 DNA CVX QL NAA+PROBE: NEGATIVE
HPV18 DNA CVX QL NAA+PROBE: NEGATIVE
HUMAN PAPILLOMA VIRUS FINAL DIAGNOSIS: NORMAL
LDLC SERPL CALC-MCNC: 149 MG/DL
NONHDLC SERPL-MCNC: 168 MG/DL
POTASSIUM SERPL-SCNC: 4.2 MMOL/L (ref 3.4–5.3)
PROT SERPL-MCNC: 7.3 G/DL (ref 6.4–8.3)
SODIUM SERPL-SCNC: 138 MMOL/L (ref 135–145)
T4 FREE SERPL-MCNC: 1.22 NG/DL (ref 0.9–1.7)
TRIGL SERPL-MCNC: 96 MG/DL
TSH SERPL DL<=0.005 MIU/L-ACNC: 3.81 UIU/ML (ref 0.3–4.2)

## 2025-02-12 PROCEDURE — 99214 OFFICE O/P EST MOD 30 MIN: CPT | Mod: 25 | Performed by: PHYSICIAN ASSISTANT

## 2025-02-12 PROCEDURE — 80053 COMPREHEN METABOLIC PANEL: CPT | Performed by: PHYSICIAN ASSISTANT

## 2025-02-12 PROCEDURE — 96127 BRIEF EMOTIONAL/BEHAV ASSMT: CPT | Performed by: PHYSICIAN ASSISTANT

## 2025-02-12 PROCEDURE — 87624 HPV HI-RISK TYP POOLED RSLT: CPT | Performed by: PHYSICIAN ASSISTANT

## 2025-02-12 PROCEDURE — 36415 COLL VENOUS BLD VENIPUNCTURE: CPT | Performed by: PHYSICIAN ASSISTANT

## 2025-02-12 PROCEDURE — 99396 PREV VISIT EST AGE 40-64: CPT | Performed by: PHYSICIAN ASSISTANT

## 2025-02-12 PROCEDURE — 80061 LIPID PANEL: CPT | Performed by: PHYSICIAN ASSISTANT

## 2025-02-12 PROCEDURE — 84443 ASSAY THYROID STIM HORMONE: CPT | Performed by: PHYSICIAN ASSISTANT

## 2025-02-12 PROCEDURE — 83036 HEMOGLOBIN GLYCOSYLATED A1C: CPT | Performed by: PHYSICIAN ASSISTANT

## 2025-02-12 PROCEDURE — 84439 ASSAY OF FREE THYROXINE: CPT | Performed by: PHYSICIAN ASSISTANT

## 2025-02-12 RX ORDER — NORTRIPTYLINE HYDROCHLORIDE 50 MG/1
CAPSULE ORAL
Qty: 90 CAPSULE | Refills: 3 | Status: SHIPPED | OUTPATIENT
Start: 2025-02-12

## 2025-02-12 RX ORDER — PROPRANOLOL HYDROCHLORIDE 60 MG/1
CAPSULE, EXTENDED RELEASE ORAL
Qty: 90 CAPSULE | Refills: 3 | Status: SHIPPED | OUTPATIENT
Start: 2025-02-12

## 2025-02-12 RX ORDER — VENLAFAXINE HYDROCHLORIDE 150 MG/1
CAPSULE, EXTENDED RELEASE ORAL
Qty: 90 CAPSULE | Refills: 3 | Status: SHIPPED | OUTPATIENT
Start: 2025-02-12

## 2025-02-12 RX ORDER — LEVOTHYROXINE SODIUM 88 UG/1
88 TABLET ORAL DAILY
Qty: 90 TABLET | Refills: 3 | Status: SHIPPED | OUTPATIENT
Start: 2025-02-12

## 2025-02-12 RX ORDER — VENLAFAXINE HYDROCHLORIDE 75 MG/1
75 CAPSULE, EXTENDED RELEASE ORAL DAILY
Qty: 90 CAPSULE | Refills: 3 | Status: SHIPPED | OUTPATIENT
Start: 2025-02-12

## 2025-02-12 ASSESSMENT — PAIN SCALES - GENERAL: PAINLEVEL_OUTOF10: NO PAIN (0)

## 2025-02-12 NOTE — PROGRESS NOTES
Preventive Care Visit  [facility] [place] Yoon PA-C, Family Medicine  Feb 12, 2025      Assessment & Plan     Routine general medical examination at a health care facility  - Lipid Profile (Chol, Trig, HDL, LDL calc); Future  - Comprehensive metabolic panel (BMP + Alb, Alk Phos, ALT, AST, Total. Bili, TP); Future  - Hemoglobin A1c; Future  - Lipid Profile (Chol, Trig, HDL, LDL calc)  - Comprehensive metabolic panel (BMP + Alb, Alk Phos, ALT, AST, Total. Bili, TP)  - Hemoglobin A1c    Class 1 obesity due to excess calories without serious comorbidity with body mass index (BMI) of 32.0 to 32.9 in adult  Patient is concerned about her weight.  She admits that she does not exercise and is not following a diet. She previously was successful at losing 40 lb with [facility], but ate mostly granola bars and gained the weight back as soon as she stopped doing the program.   Had a long discussion about the importance of daily exercise and healthy eating to achieve lasting weight loss. She is interested in medication options to help  with weight loss. She is eligible for medication options and so we discussed GLP-1 medications as well as alternatives such as wellbutrin/naltrexone, phentermine and topamax. She would like to think about these options while we await labs, also encouraged her to check with insurance on coverage  - Adult Nutrition  Referral; Future    Major depressive disorder, single episode, moderate (H)  stable  - nortriptyline (PAMELOR) 50 MG capsule; TAKE 3 CAPSULES BY MOUTH NIGHTLY  - venlafaxine (EFFEXOR XR) 150 MG 24 hr capsule; TAKE 1 CAPSULE DAILY (ALONG WITH 75 MG DAILY)  - venlafaxine (EFFEXOR XR) 75 MG 24 hr capsule; Take 1 capsule (75 mg) by mouth daily.    Hypothyroidism, unspecified type  Labs today  - levothyroxine (SYNTHROID/LEVOTHROID) 88 MCG tablet; Take 1 tablet (88 mcg) by mouth daily.    Anxiety  stable  - propranolol ER (INDERAL LA) 60 MG 24 hr  "capsule; TAKE 1 CAPSULE BY MOUTH EVERY DAY    Acquired hypothyroidism  - TSH; Future  - T4, free; Future  - TSH  - T4, free    Visit for screening mammogram  - MA Screening Bilateral w/ [facility]; Future    Cervical cancer screening  - HPV and Gynecologic Cytology Panel - Recommended Age 30 - 65 Years            BMI  Estimated body mass index is 32.16 kg/m  as calculated from the following:    Height as of this encounter: 1.64 m (5' 4.57\").    Weight as of this encounter: 86.5 kg (190 lb 11.2 oz).       Counseling  Appropriate preventive services were addressed with this patient via screening, questionnaire, or discussion as appropriate for fall prevention, nutrition, physical activity, Tobacco-use cessation, social engagement, weight loss and cognition.  Checklist reviewing preventive services available has been given to the patient.  Reviewed patient's diet, addressing concerns and/or questions.   She is at risk for lack of exercise and has been provided with information to increase physical activity for the benefit of her well-being.       Follow up annually    Sravan Culp is a 59 year old, presenting for the following:  Physical (Fasting. )        2/12/2025     8:21 AM   Additional Questions   Roomed by Elisa ACEVES          Healthy Habits:     Taking medications regularly:  0  History of Present Illness       Reason for visit:  Yearly physical with labs   She is taking medications regularly.      Health Care Directive  Patient does not have a Health Care Directive: Discussed advance care planning with patient; information given to patient to review.      2/11/2025   General Health   How would you rate your overall physical health? (!) POOR   Feel stress (tense, anxious, or unable to sleep) Only a little   (!) STRESS CONCERN      2/11/2025   Nutrition   Three or more servings of calcium each day? (!) NO   Diet: Regular (no restrictions)   How many servings of fruit and vegetables per day? (!) 0-1   How many " sweetened beverages each day? 0-1         2/11/2025   Exercise   Days per week of moderate/strenous exercise 2 days   Average minutes spent exercising at this level 60 min   (!) EXERCISE CONCERN      2/11/2025   Social Factors   Frequency of gathering with friends or relatives Once a week   Worry food won't last until get money to buy more No   Food not last or not have enough money for food? No   Do you have housing? (Housing is defined as stable permanent housing and does not include staying ouside in a car, in a tent, in an abandoned building, in an overnight shelter, or couch-surfing.) Yes   Are you worried about losing your housing? No   Lack of transportation? No   Unable to get utilities (heat,electricity)? No         2/11/2025   Fall Risk   Fallen 2 or more times in the past year? No   Trouble with walking or balance? No          2/11/2025   Dental   Dentist two times every year? Yes          Today's PHQ-9 Score:       2/11/2025     5:43 PM   PHQ-9 SCORE   PHQ-9 Total Score MyChart 0   PHQ-9 Total Score 0        Patient-reported         2/11/2025   Substance Use   Alcohol more than 3/day or more than 7/wk No   Do you use any other substances recreationally? No     Social History     Tobacco Use    Smoking status: Never    Smokeless tobacco: Never   Vaping Use    Vaping status: Never Used   Substance Use Topics    Alcohol use: Yes     Alcohol/week: 0.0 - 2.0 standard drinks of alcohol     Comment: 3 times/month    Drug use: No        Mammogram Screening - Annual screen due to greater than 20% lifetime risk as estimated by Breast Cancer Risk Calculator        2/11/2025   STI Screening   New sexual partner(s) since last STI/HIV test? No     History of abnormal Pap smear: No - age 30- 64 PAP with HPV every 5 years recommended        Latest Ref Rng & Units 5/1/2018     8:45 AM 5/1/2018     8:32 AM 7/9/2014    12:00 AM   PAP / HPV   PAP (Historical)   NIL  NIL    HPV 16 DNA NEG^Negative Negative      HPV 18 DNA  "NEG^Negative Negative      Other HR HPV NEG^Negative Negative        ASCVD Risk   The 10-year ASCVD risk score (Karin SANCHEZ, et al., 2019) is: 3.4%    Values used to calculate the score:      Age: 59 years      Sex: Female      Is Non- : No      Diabetic: No      Tobacco smoker: No      Systolic Blood Pressure: 123 mmHg      Is BP treated: No      HDL Cholesterol: 52 mg/dL      Total Cholesterol: 216 mg/dL    Reviewed and updated as needed this visit by Provider                    Past Medical History:   Diagnosis Date    Anxiety     Depressive disorder 1995    Irritable bowel syndrome 1994    constipation    Left knee pain     Moderate major depression (H)     Pyelonephritis, unspecified 2006    Recurrent cold sores     Lysine    Thyroid disease      Past Surgical History:   Procedure Laterality Date    COLONOSCOPY N/A 01/27/2017    NL, repeat 10 years    COLONOSCOPY N/A 01/30/2017    Procedure: COLONOSCOPY;  Surgeon: Dahiana Osuna MD;  Location:  GI    EYE SURGERY      Removed lesion on lower eye lid - not cancerous    SURGICAL HISTORY OF -   01/01/2005    Exc. benign lesion left lower eyelid         Review of Systems  Constitutional, HEENT, cardiovascular, pulmonary, GI, , musculoskeletal, neuro, skin, endocrine and psych systems are negative, except as otherwise noted.     Objective    Exam  /81   Pulse 77   Temp 97.2  F (36.2  C) (Temporal)   Resp 16   Ht 1.64 m (5' 4.57\")   Wt 86.5 kg (190 lb 11.2 oz)   LMP 12/13/2016 (LMP Unknown)   SpO2 100%   BMI 32.16 kg/m     Estimated body mass index is 32.16 kg/m  as calculated from the following:    Height as of this encounter: 1.64 m (5' 4.57\").    Weight as of this encounter: 86.5 kg (190 lb 11.2 oz).    Physical Exam  GENERAL: alert and no distress  EYES: Eyes grossly normal to inspection, PERRL and conjunctivae and sclerae normal  HENT: ear canals and TM's normal, nose and mouth without ulcers or lesions  NECK: " no adenopathy, no asymmetry, masses, or scars  RESP: lungs clear to auscultation - no rales, rhonchi or wheezes  BREAST: normal without masses, tenderness or nipple discharge and no palpable axillary masses or adenopathy  CV: regular rate and rhythm, normal S1 S2, no S3 or S4, no murmur, click or rub, no peripheral edema   ABDOMEN: soft, nontender, no hepatosplenomegaly, no masses and bowel sounds normal   (female) w/bimanual: normal female external genitalia, normal urethral meatus, normal vaginal mucosa, and normal cervix/adnexa/uterus without masses or discharge  MS: no gross musculoskeletal defects noted, no edema  SKIN: no suspicious lesions or rashes  NEURO: Normal strength and tone, mentation intact and speech normal  PSYCH: mentation appears normal, affect normal/bright        Signed Electronically by: Eids Yoon PA-C

## 2025-02-13 ENCOUNTER — PATIENT OUTREACH (OUTPATIENT)
Dept: CARE COORDINATION | Facility: CLINIC | Age: 60
End: 2025-02-13
Payer: COMMERCIAL

## 2025-02-17 LAB
BKR AP ASSOCIATED HPV REPORT: NORMAL
BKR LAB AP GYN ADEQUACY: NORMAL
BKR LAB AP GYN INTERPRETATION: NORMAL
BKR LAB AP PREVIOUS ABNORMAL: NORMAL
PATH REPORT.COMMENTS IMP SPEC: NORMAL
PATH REPORT.COMMENTS IMP SPEC: NORMAL
PATH REPORT.RELEVANT HX SPEC: NORMAL

## 2025-04-13 ENCOUNTER — HEALTH MAINTENANCE LETTER (OUTPATIENT)
Age: 60
End: 2025-04-13

## 2025-07-13 DIAGNOSIS — F32.1 MAJOR DEPRESSIVE DISORDER, SINGLE EPISODE, MODERATE (H): ICD-10-CM

## 2025-07-15 RX ORDER — NORTRIPTYLINE HYDROCHLORIDE 50 MG/1
CAPSULE ORAL
Qty: 90 CAPSULE | Refills: 3 | Status: SHIPPED | OUTPATIENT
Start: 2025-07-15

## (undated) RX ORDER — ONDANSETRON 2 MG/ML
INJECTION INTRAMUSCULAR; INTRAVENOUS
Status: DISPENSED
Start: 2017-01-27

## (undated) RX ORDER — FENTANYL CITRATE 50 UG/ML
INJECTION, SOLUTION INTRAMUSCULAR; INTRAVENOUS
Status: DISPENSED
Start: 2017-01-30

## (undated) RX ORDER — FENTANYL CITRATE 50 UG/ML
INJECTION, SOLUTION INTRAMUSCULAR; INTRAVENOUS
Status: DISPENSED
Start: 2017-01-27

## (undated) RX ORDER — ONDANSETRON 2 MG/ML
INJECTION INTRAMUSCULAR; INTRAVENOUS
Status: DISPENSED
Start: 2017-01-30